# Patient Record
Sex: MALE | Race: WHITE | Employment: FULL TIME | URBAN - METROPOLITAN AREA
[De-identification: names, ages, dates, MRNs, and addresses within clinical notes are randomized per-mention and may not be internally consistent; named-entity substitution may affect disease eponyms.]

---

## 2018-06-28 ENCOUNTER — OFFICE VISIT (OUTPATIENT)
Dept: FAMILY MEDICINE | Facility: CLINIC | Age: 53
End: 2018-06-28

## 2018-06-28 ENCOUNTER — HOSPITAL ENCOUNTER (OUTPATIENT)
Dept: LAB | Facility: CLINIC | Age: 53
Discharge: HOME | End: 2018-06-28
Attending: ORTHOPAEDIC SURGERY | Admitting: PHYSICIAN ASSISTANT
Payer: COMMERCIAL

## 2018-06-28 VITALS
DIASTOLIC BLOOD PRESSURE: 82 MMHG | WEIGHT: 245 LBS | HEIGHT: 71 IN | TEMPERATURE: 98.1 F | SYSTOLIC BLOOD PRESSURE: 126 MMHG | BODY MASS INDEX: 34.3 KG/M2 | HEART RATE: 80 BPM

## 2018-06-28 DIAGNOSIS — Z71.89 ACP (ADVANCE CARE PLANNING): ICD-10-CM

## 2018-06-28 DIAGNOSIS — Z01.818 PRE-OP EXAM: Primary | ICD-10-CM

## 2018-06-28 DIAGNOSIS — R73.01 ELEVATED FASTING GLUCOSE: ICD-10-CM

## 2018-06-28 DIAGNOSIS — Z76.89 HEALTH CARE HOME: ICD-10-CM

## 2018-06-28 DIAGNOSIS — G47.33 OSA (OBSTRUCTIVE SLEEP APNEA): ICD-10-CM

## 2018-06-28 DIAGNOSIS — E66.9 OBESITY (BMI 30-39.9): ICD-10-CM

## 2018-06-28 DIAGNOSIS — M16.12 PRIMARY OSTEOARTHRITIS OF LEFT HIP: ICD-10-CM

## 2018-06-28 LAB
ERYTHROCYTE [DISTWIDTH] IN BLOOD BY AUTOMATED COUNT: 12.3 %
HBA1C MFR BLD: 5.5 % (ref 4–7)
HCT VFR BLD AUTO: 48.6 % (ref 40–53)
HEMOGLOBIN: 16.3 G/DL (ref 13.3–17.7)
MCH RBC QN AUTO: 31 PG (ref 26–33)
MCHC RBC AUTO-ENTMCNC: 33.5 G/DL (ref 31–36)
MCV RBC AUTO: 92.4 FL (ref 78–100)
MRSA DNA SPEC QL NAA+PROBE: NEGATIVE
PLATELET COUNT - QUEST: 211 10^9/L (ref 150–375)
RBC # BLD AUTO: 5.26 10*12/L (ref 4.4–5.9)
SPECIMEN SOURCE: NORMAL
WBC # BLD AUTO: 8.5 10*9/L (ref 4–11)

## 2018-06-28 PROCEDURE — 36415 COLL VENOUS BLD VENIPUNCTURE: CPT | Performed by: PHYSICIAN ASSISTANT

## 2018-06-28 PROCEDURE — 87640 STAPH A DNA AMP PROBE: CPT | Mod: XU | Performed by: PHYSICIAN ASSISTANT

## 2018-06-28 PROCEDURE — 85027 COMPLETE CBC AUTOMATED: CPT | Performed by: PHYSICIAN ASSISTANT

## 2018-06-28 PROCEDURE — 83036 HEMOGLOBIN GLYCOSYLATED A1C: CPT | Performed by: PHYSICIAN ASSISTANT

## 2018-06-28 PROCEDURE — 99203 OFFICE O/P NEW LOW 30 MIN: CPT | Performed by: PHYSICIAN ASSISTANT

## 2018-06-28 PROCEDURE — 87641 MR-STAPH DNA AMP PROBE: CPT | Performed by: PHYSICIAN ASSISTANT

## 2018-06-28 NOTE — MR AVS SNAPSHOT
"              After Visit Summary   6/28/2018    Luis M Joshi    MRN: [id number]           Patient Information     Date Of Birth          1965        Visit Information        Provider Department      6/28/2018 3:30 PM Ana Paula Mejia PA [facility]        Today's Diagnoses     Pre-op exam    -  1    Primary osteoarthritis of left hip        ACP (advance care planning)        Health Care Home        PANKAJ (obstructive sleep apnea)        Elevated fasting glucose        Obesity (BMI 30-39.9)           Follow-ups after your visit        Your next 10 appointments already scheduled     Jul 13, 2018   Procedure with Hari Bond MD   [facility] (--)    [address] [facility] [address] [phone]              Who to contact     If you have questions or need follow up information about today's clinic visit or your schedule please contact [facility] directly at [phone].  Normal or non-critical lab and imaging results will be communicated to you by MyChart, letter or phone within 4 business days after the clinic has received the results. If you do not hear from us within 7 days, please contact the clinic through MyChart or phone. If you have a critical or abnormal lab result, we will notify you by phone as soon as possible.  Submit refill requests through [employer] or call your pharmacy and they will forward the refill request to us. Please allow 3 business days for your refill to be completed.          Additional Information About Your Visit        Care EveryWhere ID     This is your Care EveryWhere ID. This could be used by other organizations to access your [place] medical records  [id number]        Your Vitals Were     Pulse Temperature Height BMI (Body Mass Index)          80 98.1  F (36.7  C) (Oral) 1.803 m (5' 11\") 34.17 kg/m2         Blood Pressure from Last 3 Encounters:   06/28/18 126/82   10/03/16 " 135/85   09/28/16 127/77    Weight from Last 3 Encounters:   06/28/18 111.1 kg (245 lb)   06/27/18 108.9 kg (240 lb)   10/02/16 108.9 kg (240 lb)              We Performed the Following     Hemoglobin A1c (BFP)     HEMOGRAM/PLATELET (BFP)     VENOUS COLLECTION        Primary Care Provider Office Phone # Fax #    SALMA Saeed [phone] [address]  [facility] [address]        Equal Access to Services     [facility]: Hadii aad ku hadasho Soomaali, waaxda luqadaha, qaybta kaalmada adeegyaelvis, waxcaitie cazares . So [facility] [phone].    ATENCIÓN: Si habla español, tiene a mays disposición servicios gratuitos de asistencia lingüística. Llame al [phone].    We comply with applicable federal civil rights laws and [place] laws. We do not discriminate on the basis of race, color, national origin, age, disability, sex, sexual orientation, or gender identity.            Thank you!     Thank you for choosing [facility]  for your care. Our goal is always to provide you with excellent care. Hearing back from our patients is one way we can continue to improve our services. Please take a few minutes to complete the written survey that you may receive in the mail after your visit with us. Thank you!             Your Updated Medication List - Protect others around you: Learn how to safely use, store and throw away your medicines at www.disposemymeds.org.      Notice  As of 6/28/2018 11:59 PM    You have not been prescribed any medications.

## 2018-06-28 NOTE — PROGRESS NOTES
[facility]  [address]  [address]  [facility] [address]  [phone]  Dept: [phone]    PRE-OP EVALUATION:  Today's date: 2018    Luis M Joshi (: 1965) presents for pre-operative evaluation assessment as requested by Dr. Hari Bond.  He requires evaluation and anesthesia risk assessment prior to undergoing surgery/procedure for treatment of deterioration of the cartilage in the left hip with degenerative tissue.    Proposed Surgery/ Procedure: Total Hip Replacement (Left)  Date of Surgery/ Procedure: 2018  Time of Surgery/ Procedure: 12:00 p.m.  Hospital/Surgical Facility: [facility]  Fax number for surgical facility: [phone]  Primary Physician: Ana Paula Mejia  Type of Anesthesia Anticipated: General    Patient has a Health Care Directive or Living Will:  NO (I gave patient document to complete)    1. NO - Do you have a history of heart attack, stroke, stent, bypass or surgery on an artery in the head, neck, heart or legs?  2. NO - Do you ever have any pain or discomfort in your chest?  3. NO - Do you have a history of  Heart Failure?  4. NO - Are you troubled by shortness of breath when: walking on the level, up a slight hill or at night?  5. NO - Do you currently have a cold, bronchitis or other respiratory infection?  6. NO - Do you have a cough, shortness of breath or wheezing?  7. NO - Do you sometimes get pains in the calves of your legs when you walk?  8. NO - Do you or anyone in your family have previous history of blood clots?  9. NO - Do you or does anyone in your family have a serious bleeding problem such as prolonged bleeding following surgeries or cuts?  10. NO - Have you ever had problems with anemia or been told to take iron pills?  11. NO - Have you had any abnormal blood loss such as black, tarry or bloody stools, or abnormal vaginal bleeding?  12. NO - Have you ever had a blood transfusion?  13.  NO - Have you or any of your relatives ever had problems with anesthesia?  14. YES - Do you have sleep apnea, excessive snoring or daytime drowsiness? Has PANKAJ  15. NO - Do you have any prosthetic heart valves?  16. NO - Do you have prosthetic joints? Plate and screws in right ankle      HPI:     HPI related to upcoming procedure: Left hip pain progressing in the last 2 years.       See problem list for active medical problems.  Problems all longstanding and stable, except as noted/documented.  See ROS for pertinent symptoms related to these conditions.                                                                                                                                                          .    MEDICAL HISTORY:     Patient Active Problem List    Diagnosis Date Noted     PANKAJ (obstructive sleep apnea) - dg 2017 06/28/2018     Priority: Medium     Elevated fasting glucose 06/28/2018     Priority: Medium     Renal colic 10/02/2016     Priority: Medium     ACP (advance care planning) 06/28/2018     Priority: Low     Health Care Home 06/28/2018     Priority: Low      Past Medical History:   Diagnosis Date     Kidney stones      Sleep apnea     CPAP will bring on the day of surgery.     Past Surgical History:   Procedure Laterality Date     C OPEN RX ANKLE DISLOCATN+FIXATN Right 2016     LASER HOLMIUM LITHOTRIPSY URETER(S), INSERT STENT, COMBINED Right 10/3/2016    Procedure: COMBINED CYSTOSCOPY, URETEROSCOPY, LASER HOLMIUM LITHOTRIPSY URETER(S), INSERT STENT;  Surgeon: Trenton Calvin MD;  Location:  OR     No current outpatient prescriptions on file.     OTC products: None    No Known Allergies   Latex Allergy: NO    Social History   Substance Use Topics     Smoking status: Never Smoker     Smokeless tobacco: Never Used     Alcohol use 2.4 oz/week     4 Standard drinks or equivalent per week     History   Drug Use No       REVIEW OF SYSTEMS:   Constitutional, neuro, ENT, endocrine, pulmonary,  "cardiac, gastrointestinal, genitourinary, musculoskeletal, integument and psychiatric systems are negative, except as otherwise noted.    EXAM:   /82 (BP Location: Left arm, Patient Position: Chair, Cuff Size: Adult Large)  Pulse 80  Temp 98.1  F (36.7  C) (Oral)  Ht 1.803 m (5' 11\")  Wt 111.1 kg (245 lb)  BMI 34.17 kg/m2    GENERAL APPEARANCE: healthy, alert and no distress     EYES: EOMI,  PERRL     HENT: ear canals and TM's normal and nose and mouth without ulcers or lesions     NECK: no adenopathy, no asymmetry, masses, or scars and thyroid normal to palpation     RESP: lungs clear to auscultation - no rales, rhonchi or wheezes     CV: regular rates and rhythm, normal S1 S2, no S3 or S4 and no murmur, click or rub     ABDOMEN:  soft, nontender, no HSM or masses and bowel sounds normal     MS: extremities normal- no gross deformities noted, no evidence of inflammation in joints, FROM in all extremities.     SKIN: no suspicious lesions or rashes     NEURO: Normal strength and tone, sensory exam grossly normal, mentation intact and speech normal     PSYCH: mentation appears normal. and affect normal/bright     LYMPHATICS: No cervical adenopathy    DIAGNOSTICS:       MRSA/MSSA screening for elective joint replacement surgery - Negative    Labs Resulted Today:   Results for orders placed or performed in visit on 06/28/18   HEMOGRAM/PLATELET (BFP)   Result Value Ref Range    WBC 8.5 4.0 - 11 10*9/L    RBC Count 5.26 4.4 - 5.9 10*12/L    Hemoglobin 16.3 13.3 - 17.7 g/dL    Hematocrit 48.6 40.0 - 53.0 %    MCV 92.4 78 - 100 fL    MCH 31.0 26 - 33 pg    MCHC 33.5 31 - 36 g/dL    RDW 12.3 %    Platelet Count 211 150 - 375 10^9/L   Hemoglobin A1c (BFP)   Result Value Ref Range    Hemoglobin A1C 5.5 4.0 - 7.0 %       Recent Labs   Lab Test  10/03/16   0636  10/02/16   1000   HGB  14.2  15.7   PLT  208  246   NA  137  137   POTASSIUM  4.7  3.6   CR  1.61*  1.35*        IMPRESSION:   Reason for surgery/procedure: " Left total hip replacement  Diagnosis/reason for consult:  Preoperative clearance      The proposed surgical procedure is considered INTERMEDIATE risk.    REVISED CARDIAC RISK INDEX  The patient has the following serious cardiovascular risks for perioperative complications such as (MI, PE, VFib and 3  AV Block):  No serious cardiac risks  INTERPRETATION: 0 risks: Class I (very low risk - 0.4% complication rate)    The patient has the following additional risks for perioperative complications:  No identified additional risks      ICD-10-CM    1. Pre-op exam Z01.818 HEMOGRAM/PLATELET (BFP)     Hemoglobin A1c (BFP)     VENOUS COLLECTION   2. Primary osteoarthritis of left hip M16.12    3. ACP (advance care planning) Z71.89    4. Health Care Home Z76.89    5. PANKAJ (obstructive sleep apnea) G47.33    6. Elevated fasting glucose R73.01    7. Obesity (BMI 30-39.9) E66.9        RECOMMENDATIONS:       Obstructive Sleep Apnea (or suspected sleep apnea)  Patient is to bring their home CPAP with them on the day of surgery      --Patient is on no chronic medications    APPROVAL GIVEN to proceed with proposed procedure, without further diagnostic evaluation       Signed Electronically by: Ana Paula Mejia PA-C    Copy of this evaluation report is provided to requesting physician.    [facility] Preop Guidelines    Revised Cardiac Risk Index

## 2018-07-10 NOTE — H&P (VIEW-ONLY)
[facility]  [address]  [address]  [facility] [address]  [phone]  Dept: [phone]    PRE-OP EVALUATION:  Today's date: 2018    Luis M Joshi (: 1965) presents for pre-operative evaluation assessment as requested by Dr. Hari Bond.  He requires evaluation and anesthesia risk assessment prior to undergoing surgery/procedure for treatment of deterioration of the cartilage in the left hip with degenerative tissue.    Proposed Surgery/ Procedure: Total Hip Replacement (Left)  Date of Surgery/ Procedure: 2018  Time of Surgery/ Procedure: 12:00 p.m.  Hospital/Surgical Facility: [facility]  Fax number for surgical facility: [phone]  Primary Physician: Ana Paula Mejia  Type of Anesthesia Anticipated: General    Patient has a Health Care Directive or Living Will:  NO (I gave patient document to complete)    1. NO - Do you have a history of heart attack, stroke, stent, bypass or surgery on an artery in the head, neck, heart or legs?  2. NO - Do you ever have any pain or discomfort in your chest?  3. NO - Do you have a history of  Heart Failure?  4. NO - Are you troubled by shortness of breath when: walking on the level, up a slight hill or at night?  5. NO - Do you currently have a cold, bronchitis or other respiratory infection?  6. NO - Do you have a cough, shortness of breath or wheezing?  7. NO - Do you sometimes get pains in the calves of your legs when you walk?  8. NO - Do you or anyone in your family have previous history of blood clots?  9. NO - Do you or does anyone in your family have a serious bleeding problem such as prolonged bleeding following surgeries or cuts?  10. NO - Have you ever had problems with anemia or been told to take iron pills?  11. NO - Have you had any abnormal blood loss such as black, tarry or bloody stools, or abnormal vaginal bleeding?  12. NO - Have you ever had a blood transfusion?  13.  NO - Have you or any of your relatives ever had problems with anesthesia?  14. YES - Do you have sleep apnea, excessive snoring or daytime drowsiness? Has PANKAJ  15. NO - Do you have any prosthetic heart valves?  16. NO - Do you have prosthetic joints? Plate and screws in right ankle      HPI:     HPI related to upcoming procedure: Left hip pain progressing in the last 2 years.       See problem list for active medical problems.  Problems all longstanding and stable, except as noted/documented.  See ROS for pertinent symptoms related to these conditions.                                                                                                                                                          .    MEDICAL HISTORY:     Patient Active Problem List    Diagnosis Date Noted     PANKAJ (obstructive sleep apnea) - dg 2017 06/28/2018     Priority: Medium     Elevated fasting glucose 06/28/2018     Priority: Medium     Renal colic 10/02/2016     Priority: Medium     ACP (advance care planning) 06/28/2018     Priority: Low     Health Care Home 06/28/2018     Priority: Low      Past Medical History:   Diagnosis Date     Kidney stones      Sleep apnea     CPAP will bring on the day of surgery.     Past Surgical History:   Procedure Laterality Date     C OPEN RX ANKLE DISLOCATN+FIXATN Right 2016     LASER HOLMIUM LITHOTRIPSY URETER(S), INSERT STENT, COMBINED Right 10/3/2016    Procedure: COMBINED CYSTOSCOPY, URETEROSCOPY, LASER HOLMIUM LITHOTRIPSY URETER(S), INSERT STENT;  Surgeon: Trenton Calvin MD;  Location:  OR     No current outpatient prescriptions on file.     OTC products: None    No Known Allergies   Latex Allergy: NO    Social History   Substance Use Topics     Smoking status: Never Smoker     Smokeless tobacco: Never Used     Alcohol use 2.4 oz/week     4 Standard drinks or equivalent per week     History   Drug Use No       REVIEW OF SYSTEMS:   Constitutional, neuro, ENT, endocrine, pulmonary,  "cardiac, gastrointestinal, genitourinary, musculoskeletal, integument and psychiatric systems are negative, except as otherwise noted.    EXAM:   /82 (BP Location: Left arm, Patient Position: Chair, Cuff Size: Adult Large)  Pulse 80  Temp 98.1  F (36.7  C) (Oral)  Ht 1.803 m (5' 11\")  Wt 111.1 kg (245 lb)  BMI 34.17 kg/m2    GENERAL APPEARANCE: healthy, alert and no distress     EYES: EOMI,  PERRL     HENT: ear canals and TM's normal and nose and mouth without ulcers or lesions     NECK: no adenopathy, no asymmetry, masses, or scars and thyroid normal to palpation     RESP: lungs clear to auscultation - no rales, rhonchi or wheezes     CV: regular rates and rhythm, normal S1 S2, no S3 or S4 and no murmur, click or rub     ABDOMEN:  soft, nontender, no HSM or masses and bowel sounds normal     MS: extremities normal- no gross deformities noted, no evidence of inflammation in joints, FROM in all extremities.     SKIN: no suspicious lesions or rashes     NEURO: Normal strength and tone, sensory exam grossly normal, mentation intact and speech normal     PSYCH: mentation appears normal. and affect normal/bright     LYMPHATICS: No cervical adenopathy    DIAGNOSTICS:       MRSA/MSSA screening for elective joint replacement surgery - Negative    Labs Resulted Today:   Results for orders placed or performed in visit on 06/28/18   HEMOGRAM/PLATELET (BFP)   Result Value Ref Range    WBC 8.5 4.0 - 11 10*9/L    RBC Count 5.26 4.4 - 5.9 10*12/L    Hemoglobin 16.3 13.3 - 17.7 g/dL    Hematocrit 48.6 40.0 - 53.0 %    MCV 92.4 78 - 100 fL    MCH 31.0 26 - 33 pg    MCHC 33.5 31 - 36 g/dL    RDW 12.3 %    Platelet Count 211 150 - 375 10^9/L   Hemoglobin A1c (BFP)   Result Value Ref Range    Hemoglobin A1C 5.5 4.0 - 7.0 %       Recent Labs   Lab Test  10/03/16   0636  10/02/16   1000   HGB  14.2  15.7   PLT  208  246   NA  137  137   POTASSIUM  4.7  3.6   CR  1.61*  1.35*        IMPRESSION:   Reason for surgery/procedure: " Left total hip replacement  Diagnosis/reason for consult:  Preoperative clearance      The proposed surgical procedure is considered INTERMEDIATE risk.    REVISED CARDIAC RISK INDEX  The patient has the following serious cardiovascular risks for perioperative complications such as (MI, PE, VFib and 3  AV Block):  No serious cardiac risks  INTERPRETATION: 0 risks: Class I (very low risk - 0.4% complication rate)    The patient has the following additional risks for perioperative complications:  No identified additional risks      ICD-10-CM    1. Pre-op exam Z01.818 HEMOGRAM/PLATELET (BFP)     Hemoglobin A1c (BFP)     VENOUS COLLECTION   2. Primary osteoarthritis of left hip M16.12    3. ACP (advance care planning) Z71.89    4. Health Care Home Z76.89    5. PANKAJ (obstructive sleep apnea) G47.33    6. Elevated fasting glucose R73.01    7. Obesity (BMI 30-39.9) E66.9        RECOMMENDATIONS:       Obstructive Sleep Apnea (or suspected sleep apnea)  Patient is to bring their home CPAP with them on the day of surgery      --Patient is on no chronic medications    APPROVAL GIVEN to proceed with proposed procedure, without further diagnostic evaluation       Signed Electronically by: Ana Paula Mejia PA-C    Copy of this evaluation report is provided to requesting physician.    [facility] Preop Guidelines    Revised Cardiac Risk Index

## 2018-07-11 NOTE — PHARMACY-ADMISSION MEDICATION HISTORY
Admission medication history interview status for this patient is complete. See [facility] admission navigator for allergy information, prior to admission medications and immunization status.     Med rec completed by pre admitting Tawnya Pritchett RN Wed Jun 27, 2018  1:45 PM         Prior to Admission medications    Not on File

## 2018-07-13 ENCOUNTER — APPOINTMENT (OUTPATIENT)
Dept: GENERAL RADIOLOGY | Facility: CLINIC | Age: 53
DRG: 470 | End: 2018-07-13
Attending: ORTHOPAEDIC SURGERY
Payer: COMMERCIAL

## 2018-07-13 ENCOUNTER — HOSPITAL ENCOUNTER (INPATIENT)
Facility: CLINIC | Age: 53
LOS: 2 days | Discharge: HOME | DRG: 470 | End: 2018-07-15
Attending: ORTHOPAEDIC SURGERY | Admitting: ORTHOPAEDIC SURGERY
Payer: COMMERCIAL

## 2018-07-13 ENCOUNTER — ANESTHESIA EVENT (OUTPATIENT)
Dept: SURGERY | Facility: CLINIC | Age: 53
DRG: 470 | End: 2018-07-13
Payer: COMMERCIAL

## 2018-07-13 ENCOUNTER — ANESTHESIA (OUTPATIENT)
Dept: SURGERY | Facility: CLINIC | Age: 53
DRG: 470 | End: 2018-07-13
Payer: COMMERCIAL

## 2018-07-13 DIAGNOSIS — Z96.642 STATUS POST TOTAL REPLACEMENT OF LEFT HIP: Primary | ICD-10-CM

## 2018-07-13 PROBLEM — Z96.649 S/P TOTAL HIP ARTHROPLASTY: Status: ACTIVE | Noted: 2018-07-13

## 2018-07-13 LAB
CREAT SERPL-MCNC: 0.83 MG/DL (ref 0.66–1.25)
GFR SERPL CREATININE-BSD FRML MDRD: >90 ML/MIN/1.7M2
PLATELET # BLD AUTO: 223 10E9/L (ref 150–450)

## 2018-07-13 PROCEDURE — 37000009 ZZH ANESTHESIA TECHNICAL FEE, EACH ADDTL 15 MIN: Performed by: ORTHOPAEDIC SURGERY

## 2018-07-13 PROCEDURE — C1713 ANCHOR/SCREW BN/BN,TIS/BN: HCPCS | Performed by: ORTHOPAEDIC SURGERY

## 2018-07-13 PROCEDURE — 25000132 ZZH RX MED GY IP 250 OP 250 PS 637: Performed by: ORTHOPAEDIC SURGERY

## 2018-07-13 PROCEDURE — 37000008 ZZH ANESTHESIA TECHNICAL FEE, 1ST 30 MIN: Performed by: ORTHOPAEDIC SURGERY

## 2018-07-13 PROCEDURE — 25800025 ZZH RX 258: Performed by: ORTHOPAEDIC SURGERY

## 2018-07-13 PROCEDURE — 25000128 H RX IP 250 OP 636: Performed by: NURSE ANESTHETIST, CERTIFIED REGISTERED

## 2018-07-13 PROCEDURE — 25000128 H RX IP 250 OP 636: Performed by: ANESTHESIOLOGY

## 2018-07-13 PROCEDURE — 40000985 XR HIP PORT LT 1 VW: Mod: TC

## 2018-07-13 PROCEDURE — 25000125 ZZHC RX 250: Performed by: PHYSICIAN ASSISTANT

## 2018-07-13 PROCEDURE — 27210794 ZZH OR GENERAL SUPPLY STERILE: Performed by: ORTHOPAEDIC SURGERY

## 2018-07-13 PROCEDURE — 25000132 ZZH RX MED GY IP 250 OP 250 PS 637: Performed by: PHYSICIAN ASSISTANT

## 2018-07-13 PROCEDURE — 12000007 ZZH R&B INTERMEDIATE

## 2018-07-13 PROCEDURE — 85049 AUTOMATED PLATELET COUNT: CPT | Performed by: ORTHOPAEDIC SURGERY

## 2018-07-13 PROCEDURE — 36000093 ZZH SURGERY LEVEL 4 1ST 30 MIN: Performed by: ORTHOPAEDIC SURGERY

## 2018-07-13 PROCEDURE — 71000013 ZZH RECOVERY PHASE 1 LEVEL 1 EA ADDTL HR: Performed by: ORTHOPAEDIC SURGERY

## 2018-07-13 PROCEDURE — 40000985 XR PELVIS AND HIP PORTABLE LEFT 1 VIEW

## 2018-07-13 PROCEDURE — C1776 JOINT DEVICE (IMPLANTABLE): HCPCS | Performed by: ORTHOPAEDIC SURGERY

## 2018-07-13 PROCEDURE — 82565 ASSAY OF CREATININE: CPT | Performed by: ORTHOPAEDIC SURGERY

## 2018-07-13 PROCEDURE — 25000566 ZZH SEVOFLURANE, EA 15 MIN: Performed by: ORTHOPAEDIC SURGERY

## 2018-07-13 PROCEDURE — 36000063 ZZH SURGERY LEVEL 4 EA 15 ADDTL MIN: Performed by: ORTHOPAEDIC SURGERY

## 2018-07-13 PROCEDURE — 25000128 H RX IP 250 OP 636: Performed by: ORTHOPAEDIC SURGERY

## 2018-07-13 PROCEDURE — 36415 COLL VENOUS BLD VENIPUNCTURE: CPT | Performed by: ORTHOPAEDIC SURGERY

## 2018-07-13 PROCEDURE — 40000306 ZZH STATISTIC PRE PROC ASSESS II: Performed by: ORTHOPAEDIC SURGERY

## 2018-07-13 PROCEDURE — 71000012 ZZH RECOVERY PHASE 1 LEVEL 1 FIRST HR: Performed by: ORTHOPAEDIC SURGERY

## 2018-07-13 PROCEDURE — 0SRB04A REPLACEMENT OF LEFT HIP JOINT WITH CERAMIC ON POLYETHYLENE SYNTHETIC SUBSTITUTE, UNCEMENTED, OPEN APPROACH: ICD-10-PCS | Performed by: ORTHOPAEDIC SURGERY

## 2018-07-13 PROCEDURE — 25000125 ZZHC RX 250: Performed by: NURSE ANESTHETIST, CERTIFIED REGISTERED

## 2018-07-13 PROCEDURE — 25000128 H RX IP 250 OP 636: Performed by: PHYSICIAN ASSISTANT

## 2018-07-13 DEVICE — IMPLANTABLE DEVICE: Type: IMPLANTABLE DEVICE | Site: HIP | Status: FUNCTIONAL

## 2018-07-13 DEVICE — IMP SCR BIOM G7 ACETABULAR DOME 6.5X20MM LOW PRO 010000997: Type: IMPLANTABLE DEVICE | Site: HIP | Status: FUNCTIONAL

## 2018-07-13 DEVICE — IMP LINER BIOM G7 ACET NEU ARCOMXL CRSLNK 36MM SZ E 10000740: Type: IMPLANTABLE DEVICE | Site: HIP | Status: FUNCTIONAL

## 2018-07-13 DEVICE — IMP SHELL BIOM G7 ACETAB PPS LIM HOLE 52MM SZ E 010000663: Type: IMPLANTABLE DEVICE | Site: HIP | Status: FUNCTIONAL

## 2018-07-13 RX ORDER — ZOLPIDEM TARTRATE 5 MG/1
5 TABLET ORAL
Status: DISCONTINUED | OUTPATIENT
Start: 2018-07-14 | End: 2018-07-15 | Stop reason: HOSPADM

## 2018-07-13 RX ORDER — AMOXICILLIN 250 MG
2 CAPSULE ORAL 2 TIMES DAILY
Status: DISCONTINUED | OUTPATIENT
Start: 2018-07-13 | End: 2018-07-15 | Stop reason: HOSPADM

## 2018-07-13 RX ORDER — ONDANSETRON 4 MG/1
4 TABLET, ORALLY DISINTEGRATING ORAL EVERY 30 MIN PRN
Status: DISCONTINUED | OUTPATIENT
Start: 2018-07-13 | End: 2018-07-13 | Stop reason: HOSPADM

## 2018-07-13 RX ORDER — GLYCOPYRROLATE 0.2 MG/ML
INJECTION, SOLUTION INTRAMUSCULAR; INTRAVENOUS PRN
Status: DISCONTINUED | OUTPATIENT
Start: 2018-07-13 | End: 2018-07-13

## 2018-07-13 RX ORDER — ONDANSETRON 2 MG/ML
4 INJECTION INTRAMUSCULAR; INTRAVENOUS EVERY 6 HOURS PRN
Status: DISCONTINUED | OUTPATIENT
Start: 2018-07-13 | End: 2018-07-15 | Stop reason: HOSPADM

## 2018-07-13 RX ORDER — CELECOXIB 200 MG/1
400 CAPSULE ORAL ONCE
Status: COMPLETED | OUTPATIENT
Start: 2018-07-13 | End: 2018-07-13

## 2018-07-13 RX ORDER — ACETAMINOPHEN 325 MG/1
650 TABLET ORAL EVERY 4 HOURS PRN
Status: DISCONTINUED | OUTPATIENT
Start: 2018-07-16 | End: 2018-07-15 | Stop reason: HOSPADM

## 2018-07-13 RX ORDER — METOPROLOL TARTRATE 1 MG/ML
1-2 INJECTION, SOLUTION INTRAVENOUS EVERY 5 MIN PRN
Status: DISCONTINUED | OUTPATIENT
Start: 2018-07-13 | End: 2018-07-13 | Stop reason: HOSPADM

## 2018-07-13 RX ORDER — AMOXICILLIN 250 MG
1 CAPSULE ORAL 2 TIMES DAILY
Status: DISCONTINUED | OUTPATIENT
Start: 2018-07-13 | End: 2018-07-15 | Stop reason: HOSPADM

## 2018-07-13 RX ORDER — SODIUM CHLORIDE 9 MG/ML
INJECTION, SOLUTION INTRAVENOUS CONTINUOUS
Status: DISCONTINUED | OUTPATIENT
Start: 2018-07-13 | End: 2018-07-14

## 2018-07-13 RX ORDER — ONDANSETRON 2 MG/ML
INJECTION INTRAMUSCULAR; INTRAVENOUS PRN
Status: DISCONTINUED | OUTPATIENT
Start: 2018-07-13 | End: 2018-07-13

## 2018-07-13 RX ORDER — HYDROMORPHONE HYDROCHLORIDE 1 MG/ML
.3-.5 INJECTION, SOLUTION INTRAMUSCULAR; INTRAVENOUS; SUBCUTANEOUS EVERY 5 MIN PRN
Status: DISCONTINUED | OUTPATIENT
Start: 2018-07-13 | End: 2018-07-13 | Stop reason: HOSPADM

## 2018-07-13 RX ORDER — FENTANYL CITRATE 50 UG/ML
INJECTION, SOLUTION INTRAMUSCULAR; INTRAVENOUS PRN
Status: DISCONTINUED | OUTPATIENT
Start: 2018-07-13 | End: 2018-07-13

## 2018-07-13 RX ORDER — HYDROXYZINE HYDROCHLORIDE 25 MG/1
25 TABLET, FILM COATED ORAL EVERY 6 HOURS PRN
Status: DISCONTINUED | OUTPATIENT
Start: 2018-07-13 | End: 2018-07-15 | Stop reason: HOSPADM

## 2018-07-13 RX ORDER — NALOXONE HYDROCHLORIDE 0.4 MG/ML
.1-.4 INJECTION, SOLUTION INTRAMUSCULAR; INTRAVENOUS; SUBCUTANEOUS
Status: DISCONTINUED | OUTPATIENT
Start: 2018-07-13 | End: 2018-07-13

## 2018-07-13 RX ORDER — ONDANSETRON 2 MG/ML
4 INJECTION INTRAMUSCULAR; INTRAVENOUS EVERY 30 MIN PRN
Status: DISCONTINUED | OUTPATIENT
Start: 2018-07-13 | End: 2018-07-13 | Stop reason: HOSPADM

## 2018-07-13 RX ORDER — CEFAZOLIN SODIUM 2 G/100ML
2 INJECTION, SOLUTION INTRAVENOUS EVERY 8 HOURS
Status: COMPLETED | OUTPATIENT
Start: 2018-07-13 | End: 2018-07-14

## 2018-07-13 RX ORDER — NALOXONE HYDROCHLORIDE 0.4 MG/ML
.1-.4 INJECTION, SOLUTION INTRAMUSCULAR; INTRAVENOUS; SUBCUTANEOUS
Status: ACTIVE | OUTPATIENT
Start: 2018-07-13 | End: 2018-07-14

## 2018-07-13 RX ORDER — LIDOCAINE 40 MG/G
CREAM TOPICAL
Status: DISCONTINUED | OUTPATIENT
Start: 2018-07-13 | End: 2018-07-15 | Stop reason: HOSPADM

## 2018-07-13 RX ORDER — ONDANSETRON 2 MG/ML
4 INJECTION INTRAMUSCULAR; INTRAVENOUS EVERY 6 HOURS
Status: DISPENSED | OUTPATIENT
Start: 2018-07-13 | End: 2018-07-14

## 2018-07-13 RX ORDER — OXYCODONE HYDROCHLORIDE 5 MG/1
5-10 TABLET ORAL
Status: DISCONTINUED | OUTPATIENT
Start: 2018-07-13 | End: 2018-07-15 | Stop reason: HOSPADM

## 2018-07-13 RX ORDER — CEFAZOLIN SODIUM 2 G/100ML
2 INJECTION, SOLUTION INTRAVENOUS
Status: COMPLETED | OUTPATIENT
Start: 2018-07-13 | End: 2018-07-13

## 2018-07-13 RX ORDER — FENTANYL CITRATE 50 UG/ML
25-50 INJECTION, SOLUTION INTRAMUSCULAR; INTRAVENOUS
Status: DISCONTINUED | OUTPATIENT
Start: 2018-07-13 | End: 2018-07-13 | Stop reason: HOSPADM

## 2018-07-13 RX ORDER — DEXAMETHASONE SODIUM PHOSPHATE 4 MG/ML
INJECTION, SOLUTION INTRA-ARTICULAR; INTRALESIONAL; INTRAMUSCULAR; INTRAVENOUS; SOFT TISSUE PRN
Status: DISCONTINUED | OUTPATIENT
Start: 2018-07-13 | End: 2018-07-13

## 2018-07-13 RX ORDER — CEFAZOLIN SODIUM 1 G/3ML
1 INJECTION, POWDER, FOR SOLUTION INTRAMUSCULAR; INTRAVENOUS SEE ADMIN INSTRUCTIONS
Status: DISCONTINUED | OUTPATIENT
Start: 2018-07-13 | End: 2018-07-13 | Stop reason: HOSPADM

## 2018-07-13 RX ORDER — NEOSTIGMINE METHYLSULFATE 1 MG/ML
VIAL (ML) INJECTION PRN
Status: DISCONTINUED | OUTPATIENT
Start: 2018-07-13 | End: 2018-07-13

## 2018-07-13 RX ORDER — SODIUM CHLORIDE, SODIUM LACTATE, POTASSIUM CHLORIDE, CALCIUM CHLORIDE 600; 310; 30; 20 MG/100ML; MG/100ML; MG/100ML; MG/100ML
INJECTION, SOLUTION INTRAVENOUS CONTINUOUS
Status: DISCONTINUED | OUTPATIENT
Start: 2018-07-13 | End: 2018-07-13 | Stop reason: HOSPADM

## 2018-07-13 RX ORDER — CELECOXIB 200 MG/1
200 CAPSULE ORAL 2 TIMES DAILY
Status: DISCONTINUED | OUTPATIENT
Start: 2018-07-14 | End: 2018-07-15 | Stop reason: HOSPADM

## 2018-07-13 RX ORDER — GLYCINE 1.5 G/100ML
SOLUTION IRRIGATION PRN
Status: DISCONTINUED | OUTPATIENT
Start: 2018-07-13 | End: 2018-07-13 | Stop reason: HOSPADM

## 2018-07-13 RX ORDER — LIDOCAINE 40 MG/G
CREAM TOPICAL
Status: DISCONTINUED | OUTPATIENT
Start: 2018-07-13 | End: 2018-07-13 | Stop reason: HOSPADM

## 2018-07-13 RX ORDER — PROPOFOL 10 MG/ML
INJECTION, EMULSION INTRAVENOUS PRN
Status: DISCONTINUED | OUTPATIENT
Start: 2018-07-13 | End: 2018-07-13

## 2018-07-13 RX ORDER — HYDROXYZINE HYDROCHLORIDE 25 MG/1
25 TABLET, FILM COATED ORAL 3 TIMES DAILY PRN
Status: DISCONTINUED | OUTPATIENT
Start: 2018-07-13 | End: 2018-07-15 | Stop reason: HOSPADM

## 2018-07-13 RX ORDER — ONDANSETRON 4 MG/1
4 TABLET, ORALLY DISINTEGRATING ORAL EVERY 6 HOURS PRN
Status: DISCONTINUED | OUTPATIENT
Start: 2018-07-13 | End: 2018-07-15 | Stop reason: HOSPADM

## 2018-07-13 RX ORDER — LIDOCAINE HYDROCHLORIDE 10 MG/ML
INJECTION, SOLUTION INFILTRATION; PERINEURAL PRN
Status: DISCONTINUED | OUTPATIENT
Start: 2018-07-13 | End: 2018-07-13

## 2018-07-13 RX ORDER — HYDROMORPHONE HYDROCHLORIDE 1 MG/ML
.3-.5 INJECTION, SOLUTION INTRAMUSCULAR; INTRAVENOUS; SUBCUTANEOUS
Status: DISCONTINUED | OUTPATIENT
Start: 2018-07-13 | End: 2018-07-15 | Stop reason: HOSPADM

## 2018-07-13 RX ORDER — PANTOPRAZOLE SODIUM 40 MG/1
40 TABLET, DELAYED RELEASE ORAL ONCE
Status: COMPLETED | OUTPATIENT
Start: 2018-07-13 | End: 2018-07-13

## 2018-07-13 RX ORDER — ACETAMINOPHEN 325 MG/1
975 TABLET ORAL EVERY 8 HOURS
Status: DISCONTINUED | OUTPATIENT
Start: 2018-07-13 | End: 2018-07-15 | Stop reason: HOSPADM

## 2018-07-13 RX ORDER — PROCHLORPERAZINE MALEATE 10 MG
10 TABLET ORAL EVERY 6 HOURS PRN
Status: DISCONTINUED | OUTPATIENT
Start: 2018-07-13 | End: 2018-07-15 | Stop reason: HOSPADM

## 2018-07-13 RX ADMIN — PANTOPRAZOLE SODIUM 40 MG: 40 TABLET, DELAYED RELEASE ORAL at 10:45

## 2018-07-13 RX ADMIN — ROCURONIUM BROMIDE 50 MG: 10 INJECTION INTRAVENOUS at 12:21

## 2018-07-13 RX ADMIN — LIDOCAINE HYDROCHLORIDE 50 MG: 10 INJECTION, SOLUTION INFILTRATION; PERINEURAL at 12:21

## 2018-07-13 RX ADMIN — GLYCOPYRROLATE 0.6 MG: 0.2 INJECTION, SOLUTION INTRAMUSCULAR; INTRAVENOUS at 14:12

## 2018-07-13 RX ADMIN — FENTANYL CITRATE 50 MCG: 50 INJECTION INTRAMUSCULAR; INTRAVENOUS at 14:54

## 2018-07-13 RX ADMIN — PROPOFOL 200 MG: 10 INJECTION, EMULSION INTRAVENOUS at 12:21

## 2018-07-13 RX ADMIN — SODIUM CHLORIDE: 9 INJECTION, SOLUTION INTRAVENOUS at 19:52

## 2018-07-13 RX ADMIN — ACETAMINOPHEN 975 MG: 325 TABLET, FILM COATED ORAL at 17:38

## 2018-07-13 RX ADMIN — CEFAZOLIN SODIUM 2 G: 2 INJECTION, SOLUTION INTRAVENOUS at 22:03

## 2018-07-13 RX ADMIN — OXYCODONE HYDROCHLORIDE 5 MG: 5 TABLET ORAL at 22:02

## 2018-07-13 RX ADMIN — FENTANYL CITRATE 250 MCG: 50 INJECTION, SOLUTION INTRAMUSCULAR; INTRAVENOUS at 12:21

## 2018-07-13 RX ADMIN — SODIUM CHLORIDE, POTASSIUM CHLORIDE, SODIUM LACTATE AND CALCIUM CHLORIDE: 600; 310; 30; 20 INJECTION, SOLUTION INTRAVENOUS at 11:16

## 2018-07-13 RX ADMIN — CEFAZOLIN SODIUM 1 G: 2 INJECTION, SOLUTION INTRAVENOUS at 14:29

## 2018-07-13 RX ADMIN — SENNOSIDES AND DOCUSATE SODIUM 2 TABLET: 8.6; 5 TABLET ORAL at 19:56

## 2018-07-13 RX ADMIN — RANITIDINE 150 MG: 150 TABLET ORAL at 19:55

## 2018-07-13 RX ADMIN — ROCURONIUM BROMIDE 10 MG: 10 INJECTION INTRAVENOUS at 13:20

## 2018-07-13 RX ADMIN — SODIUM CHLORIDE, POTASSIUM CHLORIDE, SODIUM LACTATE AND CALCIUM CHLORIDE: 600; 310; 30; 20 INJECTION, SOLUTION INTRAVENOUS at 12:47

## 2018-07-13 RX ADMIN — ONDANSETRON 4 MG: 2 INJECTION INTRAMUSCULAR; INTRAVENOUS at 17:39

## 2018-07-13 RX ADMIN — GLYCOPYRROLATE 0.2 MG: 0.2 INJECTION, SOLUTION INTRAMUSCULAR; INTRAVENOUS at 12:21

## 2018-07-13 RX ADMIN — SODIUM CHLORIDE 1000 ML: 9 INJECTION, SOLUTION INTRAVENOUS at 17:45

## 2018-07-13 RX ADMIN — FENTANYL CITRATE 50 MCG: 50 INJECTION, SOLUTION INTRAMUSCULAR; INTRAVENOUS at 14:34

## 2018-07-13 RX ADMIN — Medication 1 G: at 12:33

## 2018-07-13 RX ADMIN — FENTANYL CITRATE 50 MCG: 50 INJECTION INTRAMUSCULAR; INTRAVENOUS at 15:36

## 2018-07-13 RX ADMIN — ONDANSETRON 4 MG: 2 INJECTION INTRAMUSCULAR; INTRAVENOUS at 14:07

## 2018-07-13 RX ADMIN — ROCURONIUM BROMIDE 10 MG: 10 INJECTION INTRAVENOUS at 13:51

## 2018-07-13 RX ADMIN — CEFAZOLIN SODIUM 2 G: 2 INJECTION, SOLUTION INTRAVENOUS at 12:27

## 2018-07-13 RX ADMIN — HYDROMORPHONE HYDROCHLORIDE 0.5 MG: 1 INJECTION, SOLUTION INTRAMUSCULAR; INTRAVENOUS; SUBCUTANEOUS at 12:38

## 2018-07-13 RX ADMIN — Medication 0.5 MG: at 17:38

## 2018-07-13 RX ADMIN — CELECOXIB 400 MG: 200 CAPSULE ORAL at 10:45

## 2018-07-13 RX ADMIN — OXYCODONE HYDROCHLORIDE 5 MG: 5 TABLET ORAL at 18:53

## 2018-07-13 RX ADMIN — HYDROMORPHONE HYDROCHLORIDE 0.5 MG: 1 INJECTION, SOLUTION INTRAMUSCULAR; INTRAVENOUS; SUBCUTANEOUS at 12:51

## 2018-07-13 RX ADMIN — Medication 3 MG: at 14:12

## 2018-07-13 RX ADMIN — MIDAZOLAM 2 MG: 1 INJECTION INTRAMUSCULAR; INTRAVENOUS at 12:13

## 2018-07-13 RX ADMIN — DEXAMETHASONE SODIUM PHOSPHATE 8 MG: 4 INJECTION, SOLUTION INTRA-ARTICULAR; INTRALESIONAL; INTRAMUSCULAR; INTRAVENOUS; SOFT TISSUE at 12:21

## 2018-07-13 ASSESSMENT — ENCOUNTER SYMPTOMS: DYSRHYTHMIAS: 0

## 2018-07-13 NOTE — IP AVS SNAPSHOT
MRN:[id number]                      After Visit Summary   7/13/2018    Luis M Joshi    MRN: [id number]           Thank you!     Thank you for choosing [facility] for your care. Our goal is always to provide you with excellent care. Hearing back from our patients is one way we can continue to improve our services. Please take a few minutes to complete the written survey that you may receive in the mail after you visit. If you would like to speak to someone directly about your visit please contact Patient Relations at [phone]. Thank you!          Patient Information     Date Of Birth          1965        Designated Caregiver       Most Recent Value    Caregiver    Will someone help with your care after discharge? yes    Name of designated caregiver Adelita Joshi    Phone number of caregiver [phone]    Caregiver address [place]      About your hospital stay     You were admitted on:  July 13, 2018 You last received care in the:  [facility]    You were discharged on:  July 15, 2018        Reason for your hospital stay       Total hip replacement                  Who to Call     For medical emergencies, please call 911.  For non-urgent questions about your medical care, please call your primary care provider or clinic, [phone]  For questions related to your surgery, please call your surgery clinic        Attending Provider     Provider Specialty    Hari Bond MD Orthopaedic Surgery       Primary Care Provider Office Phone # Fax #    SALMA Saeed [phone]      After Care Instructions     Activity       Your activity upon discharge: activity as tolerated            Diet       Follow this diet upon discharge: Orders Placed This Encounter      Advance Diet as Tolerated: Regular Diet Adult            Wound care and dressings       Leave aquacel dressing in place until post-op follow-up.  If it becomes loose  or soiled then remove and replace with daily dry dressings                  Follow-up Appointments     Follow-up and recommended labs and tests        Follow up with Dr. Bond within 10-16 days.  No follow up labs or test are needed. Call [phone] with questions.                  Further instructions from your care team       Call surgeon or primary care md before or after your follow appointment if any of these issues occur:  1.Uncontrolled/persistant temperature, chills, sweats. Temp >101  2. Uncontrolled pain despite pain medication  3. Numbness or tingling in operative leg, weakness, falls  4. Increased/persistant bleeding,redness,swelling, bruising, drainage (yellow/odourous), or bleeding from or around incision or incision pulling apart.  5. Dizziness, lightheaded (could be pain meds)  6. Calf pain, hard/swollen/warm area, chest pain or shortness of breath   7. Constipation despite taking over the counter stool softner and laxative for constipation   8. Trouble voiding or signs or symptoms of urinary tract infection  9. Uncontrolled bleeding (nose bleed, incision)  10.  if unable to wake call [phone]    Please have all family and caregivers review this information.    Other instructions:  See general discharge instruction sheet for hips.  1. Do exercises as instructed by therapist-slowly increased activity as pain tolerates  2.   3. Walk daily increasing distance and time each day by a little.  4. Ice hip for swelling and discomfort 3-4 times daily for 20 minutes  5.Notify your dentist of your implant so you can get antibiotics before any dental work or for any other invasive procedure.  6. Take over the counter stool softener (mirilax, senna, colace) while on narcotics to prevent constipation so bowel movements are regular., take laxative (milk of magnesia) or a suppository if no bm in 2-3 days (take as directed)  7.  8.Keep track of when you take all medication, especially pain medication. See bottles for  "instructions and side effects  9. Incentive spirometer hourly to help prevent pneumonia  10. See medication handouts for medication information and side effects  11. DO NOT DRINK alcohol or DRIVE on narcotic pain medication.    Friends and family please read and review all discharge information and medication sheet    IF unable to wake call [phone]    Dressing information:Do not change dressing  Can shower with aquacell dressing, it is waterproof-do not remove the dressing will be changed at the follow up apt. with your suregon  Inspect surrounding skin daily for s/s of infections.   Do not soak in tub or pool.   If dressing  Loosens wash hands and tape edge down then call surgeon for direction-do not touch incision   If dressing is 1/2 or more full of drainage or leaking call your suregon  If dressing is half full of drainage call your surgeon      Follow up Appointment:  Make follow up apt. 10-14 days post surgery,call to make apt. [phone]. Call the same number with any questions or issues prior to or after apt.        Pending Results     No orders found from 7/11/2018 to 7/14/2018.            Statement of Approval     Ordered          07/14/18 0645  I have reviewed and agree with all the recommendations and orders detailed in this document.  EFFECTIVE NOW     Approved and electronically signed by:  Hari Bond MD             Admission Information     Date & Time Provider Department Dept. Phone    7/13/2018 Hari Bond MD [facility] Ortho Spine [phone]      Your Vitals Were     Blood Pressure Pulse Temperature Respirations Height Weight    119/76 (BP Location: Left arm) 75 97.2  F (36.2  C) (Oral) 16 1.803 m (5' 11\") 110.7 kg (244 lb)    Pulse Oximetry BMI (Body Mass Index)                95% 34.03 kg/m2          Care EveryWhere ID     This is your Care EveryWhere ID. This could be used by other organizations to access your [place] medical records  [id number]        Equal " Access to Services     [facility]: Hadii aad ku hadpericoaminta Sonicolleali, waaxda luqadaha, qaybta kaalmada adesanfordelvis, ilana subramanianmagnoliakrista fisher. [facility] [phone].    ATENCIÓN: Si kranthila ed, tiene a mays disposición servicios gratuitos de asistencia lingüística. Llame al [phone].    We comply with applicable federal civil rights laws and [place] laws. We do not discriminate on the basis of race, color, national origin, age, disability, sex, sexual orientation, or gender identity.               Review of your medicines      START taking        Dose / Directions    aspirin 325 MG EC tablet        Dose:  325 mg   Take 1 tablet (325 mg) by mouth 2 times daily   Quantity:  70 tablet   Refills:  0       order for DME        Equipment being ordered: Walker Wheels () and Walker () Treatment Diagnosis: Gait instability   Quantity:  1 each   Refills:  0       oxyCODONE-acetaminophen 5-325 MG per tablet   Commonly known as:  PERCOCET   Notes to Patient:  Take one tab for pain 1-4  2 tabs for pain 5-10  Each tab has 325mg of tylenol in it. Do not take additional tylenol        Dose:  1-2 tablet   Take 1-2 tablets by mouth every 4 hours as needed for pain   Quantity:  60 tablet   Refills:  0       senna-docusate 8.6-50 MG per tablet   Commonly known as:  SENOKOT-S;PERICOLACE        Dose:  2 tablet   Take 2 tablets by mouth 2 times daily   Quantity:  40 tablet   Refills:  0            Where to get your medicines      These medications were sent to [facility] [id number] [facility] [address]     Phone:  [phone]     aspirin 325 MG EC tablet    senna-docusate 8.6-50 MG per tablet         Some of these will need a paper prescription and others can be bought over the counter. Ask your nurse if you have questions.     Bring a paper prescription for each of these medications     order for DME    oxyCODONE-acetaminophen 5-325 MG  per tablet                Protect others around you: Learn how to safely use, store and throw away your medicines at www.disposemymeds.org.        Information about OPIOIDS     PRESCRIPTION OPIOIDS: WHAT YOU NEED TO KNOW   We gave you an opioid (narcotic) pain medicine. It is important to manage your pain, but opioids are not always the best choice. You should first try all the other options your care team gave you. Take this medicine for as short a time (and as few doses) as possible.     These medicines have risks:    DO NOT drive when on new or higher doses of pain medicine. These medicines can affect your alertness and reaction times, and you could be arrested for driving under the influence (DUI). If you need to use opioids long-term, talk to your care team about driving.    DO NOT operate heave machinery    DO NOT do any other dangerous activities while taking these medicines.     DO NOT drink any alcohol while taking these medicines.      If the opioid prescribed includes acetaminophen, DO NOT take with any other medicines that contain acetaminophen. Read all labels carefully. Look for the word  acetaminophen  or  Tylenol.  Ask your pharmacist if you have questions or are unsure.    You can get addicted to pain medicines, especially if you have a history of addiction (chemical, alcohol or substance dependence). Talk to your care team about ways to reduce this risk.    Store your pills in a secure place, locked if possible. We will not replace any lost or stolen medicine. If you don t finish your medicine, please throw away (dispose) as directed by your pharmacist. The [place] Pollution Control Agency has more information about safe disposal: https://www.pca.state.[place]/living-green/managing-unwanted-medications.     All opioids tend to cause constipation. Drink plenty of water and eat foods that have a lot of fiber, such as fruits, vegetables, prune juice, apple juice and high-fiber cereal. Take a laxative  (Miralax, milk of magnesia, Colace, Senna) if you don t move your bowels at least every other day.              Medication List: This is a list of all your medications and when to take them. Check marks below indicate your daily home schedule. Keep this list as a reference.      Medications           Morning Afternoon Evening Bedtime As Needed    aspirin 325 MG EC tablet   Take 1 tablet (325 mg) by mouth 2 times daily   Next Dose Due:  Sunday evening  Daily at 8am and 8pm until all complete                                        order for DME   Equipment being ordered: Walker Wheels () and Walker () Treatment Diagnosis: Gait instability                                oxyCODONE-acetaminophen 5-325 MG per tablet   Commonly known as:  PERCOCET   Take 1-2 tablets by mouth every 4 hours as needed for pain   Next Dose Due:  Can have after 5pm today     Notes to Patient:  Take one tab for pain 1-4  2 tabs for pain 5-10  Each tab has 325mg of tylenol in it. Do not take additional tylenol                                   senna-docusate 8.6-50 MG per tablet   Commonly known as:  SENOKOT-S;PERICOLACE   Take 2 tablets by mouth 2 times daily   Last time this was given:  2 tablets on 7/15/2018  8:37 AM   Next Dose Due:  Sunday evening  Take while on narcotics or so that your bowel movements are regular.

## 2018-07-13 NOTE — ANESTHESIA CARE TRANSFER NOTE
Patient: Luis M Joshi    Procedure(s):  Left total hip arthroplasty  Choice anesthesia - Wound Class: I-Clean    Diagnosis: DJD  Diagnosis Additional Information: No value filed.    Anesthesia Type:   General, ETT     Note:  Airway :Face Mask  Patient transferred to:PACU  Comments:   4/4, moving all extremities, pt follows commands, suctioned orally, extubated without complications.Pt tx to PACU, VSS, pt comfortable. Report given to  PACU RN.Handoff Report: Identifed the Patient, Identified the Reponsible Provider, Reviewed the pertinent medical history, Discussed the surgical course, Reviewed Intra-OP anesthesia mangement and issues during anesthesia, Set expectations for post-procedure period and Allowed opportunity for questions and acknowledgement of understanding      Vitals: (Last set prior to Anesthesia Care Transfer)    CRNA VITALS  7/13/2018 1414 - 7/13/2018 1452      7/13/2018             NIBP: (!)  137/97    NIBP Mean: 123                Electronically Signed By: LUDMILA Miguel CRNA  July 13, 2018  2:52 PM

## 2018-07-13 NOTE — ANESTHESIA PREPROCEDURE EVALUATION
Anesthesia Evaluation     .             ROS/MED HX    ENT/Pulmonary:     (+)sleep apnea, , . .    Neurologic:      (-) TIA, Other neuro hx and Dementia   Cardiovascular:        (-) hypertension, CAD, arrhythmias, pulmonary hypertension and dyslipidemia   METS/Exercise Tolerance:     Hematologic:        (-) anemia   Musculoskeletal:   (+) arthritis, , , -       GI/Hepatic:         Renal/Genitourinary:     (+) Nephrolithiasis ,       Endo:     (+) Obesity, .   (-) Type I DM, Type II DM, thyroid disease and chronic steroid usage   Psychiatric:        (-) psychiatric history   Infectious Disease:  - neg infectious disease ROS       Malignancy:      - no malignancy   Other:    (+) H/O Chronic Pain,                   Physical Exam      Airway   Mallampati: II  TM distance: >3 FB  Neck ROM: full    Dental     Cardiovascular   Rhythm and rate: regular and normal  (-) no murmur    Pulmonary    breath sounds clear to auscultation    Other findings: Lab Test        06/28/18     10/03/16     10/02/16                       1613          0636          1000          WBC          8.5          12.4*        12.3*         HGB          16.3         14.2         15.7          MCV          92.4         91           88            PLT          211          208          246            Lab Test        10/03/16     10/02/16     09/28/16                       0636          1000          0714          NA           137          137          140           POTASSIUM    4.7          3.6          3.8           CHLORIDE     104          101          108           CO2          27           29           25            BUN          12           13           16            CR           1.61*        1.35*        1.09          ANIONGAP     6            7            7             WAQAS          8.5          9.2          8.8           GLC          131*         124*         134*                        Anesthesia Plan      History & Physical Review  History and  physical reviewed and following examination; no interval change.    ASA Status:  2 .    NPO Status:  > 8 hours    Plan for General and ETT with Propofol induction. Maintenance will be Balanced.    PONV prophylaxis:  Ondansetron (or other 5HT-3) and Dexamethasone or Solumedrol  Additional equipment: Videolaryngoscope      Postoperative Care  Postoperative pain management:  IV analgesics and Oral pain medications.      Consents  Anesthetic plan, risks, benefits and alternatives discussed with:  Patient..                          .

## 2018-07-13 NOTE — IP AVS SNAPSHOT
[facility]    [address]    [facility] [address]    Phone:  [phone]    Fax:  [phone]                                       After Visit Summary   7/13/2018    Luis M Joshi    MRN: [id number]           After Visit Summary Signature Page     I have received my discharge instructions, and my questions have been answered. I have discussed any challenges I see with this plan with the nurse or doctor.    ..........................................................................................................................................  Patient/Patient Representative Signature      ..........................................................................................................................................  Patient Representative Print Name and Relationship to Patient    ..................................................               ................................................  Date                                            Time    ..........................................................................................................................................  Reviewed by Signature/Title    ...................................................              ..............................................  Date                                                            Time

## 2018-07-13 NOTE — ANESTHESIA POSTPROCEDURE EVALUATION
Patient: Luis M Joshi    Procedure(s):  Left total hip arthroplasty  Choice anesthesia - Wound Class: I-Clean    Diagnosis:DJD  Diagnosis Additional Information: Pre-operative diagnosis: DJD  Post-operative diagnosis same  Procedure: Procedure(s):  Left total hip arthroplasty  Choice anesthesia - Wound Class: I-Clean        Anesthesia Type:  General, ETT    Note:  Anesthesia Post Evaluation    Patient location during evaluation: PACU  Patient participation: Able to fully participate in evaluation  Level of consciousness: awake  Pain management: adequate  Airway patency: patent  Cardiovascular status: acceptable  Respiratory status: acceptable  Hydration status: euvolemic  PONV: controlled     Anesthetic complications: None          Last vitals:  Vitals:    07/13/18 1600 07/13/18 1615 07/13/18 1616   BP: 119/80 115/87    Pulse:      Resp: 12 11    Temp:      SpO2: 96% 96% 95%         Electronically Signed By: Francisco Cruz MD  July 13, 2018  4:16 PM

## 2018-07-13 NOTE — BRIEF OP NOTE
[facility] Brief Operative Note    Pre-operative diagnosis: DJD   Post-operative diagnosis same   Procedure: Procedure(s):  Left total hip arthroplasty  Choice anesthesia - Wound Class: I-Clean   Surgeon(s): Surgeon(s) and Role:     * Hari Bond MD - Primary     * Darlin Harrison PA-C - Assisting   Estimated blood loss: 250 mL    Specimens: * No specimens in log *   Findings: See dictation

## 2018-07-14 ENCOUNTER — APPOINTMENT (OUTPATIENT)
Dept: PHYSICAL THERAPY | Facility: CLINIC | Age: 53
DRG: 470 | End: 2018-07-14
Attending: ORTHOPAEDIC SURGERY
Payer: COMMERCIAL

## 2018-07-14 LAB
GLUCOSE SERPL-MCNC: 133 MG/DL (ref 70–99)
HGB BLD-MCNC: 12.1 G/DL (ref 13.3–17.7)

## 2018-07-14 PROCEDURE — 99207 ZZC NON-BILLABLE SERV PER CHARTING: CPT | Performed by: INTERNAL MEDICINE

## 2018-07-14 PROCEDURE — 97161 PT EVAL LOW COMPLEX 20 MIN: CPT | Mod: GP

## 2018-07-14 PROCEDURE — 12000000 ZZH R&B MED SURG/OB

## 2018-07-14 PROCEDURE — 82947 ASSAY GLUCOSE BLOOD QUANT: CPT | Performed by: ORTHOPAEDIC SURGERY

## 2018-07-14 PROCEDURE — 97116 GAIT TRAINING THERAPY: CPT | Mod: GP

## 2018-07-14 PROCEDURE — 36415 COLL VENOUS BLD VENIPUNCTURE: CPT | Performed by: ORTHOPAEDIC SURGERY

## 2018-07-14 PROCEDURE — 25000132 ZZH RX MED GY IP 250 OP 250 PS 637: Performed by: ORTHOPAEDIC SURGERY

## 2018-07-14 PROCEDURE — 25000128 H RX IP 250 OP 636: Performed by: ORTHOPAEDIC SURGERY

## 2018-07-14 PROCEDURE — 97110 THERAPEUTIC EXERCISES: CPT | Mod: GP

## 2018-07-14 PROCEDURE — 40000193 ZZH STATISTIC PT WARD VISIT

## 2018-07-14 PROCEDURE — 97530 THERAPEUTIC ACTIVITIES: CPT | Mod: GP

## 2018-07-14 PROCEDURE — 85018 HEMOGLOBIN: CPT | Performed by: ORTHOPAEDIC SURGERY

## 2018-07-14 RX ORDER — OXYCODONE AND ACETAMINOPHEN 5; 325 MG/1; MG/1
1-2 TABLET ORAL EVERY 4 HOURS PRN
Qty: 60 TABLET | Refills: 0 | Status: SHIPPED | OUTPATIENT
Start: 2018-07-14 | End: 2018-11-02

## 2018-07-14 RX ORDER — ASPIRIN 325 MG
325 TABLET, DELAYED RELEASE (ENTERIC COATED) ORAL 2 TIMES DAILY
Qty: 70 TABLET | Refills: 0 | Status: SHIPPED | OUTPATIENT
Start: 2018-07-14 | End: 2018-08-18

## 2018-07-14 RX ORDER — AMOXICILLIN 250 MG
2 CAPSULE ORAL 2 TIMES DAILY
Qty: 40 TABLET | Refills: 0 | Status: SHIPPED | OUTPATIENT
Start: 2018-07-14 | End: 2018-11-02

## 2018-07-14 RX ADMIN — OXYCODONE HYDROCHLORIDE 10 MG: 5 TABLET ORAL at 21:54

## 2018-07-14 RX ADMIN — OXYCODONE HYDROCHLORIDE 5 MG: 5 TABLET ORAL at 00:48

## 2018-07-14 RX ADMIN — SENNOSIDES AND DOCUSATE SODIUM 2 TABLET: 8.6; 5 TABLET ORAL at 21:54

## 2018-07-14 RX ADMIN — ONDANSETRON 4 MG: 2 INJECTION INTRAMUSCULAR; INTRAVENOUS at 06:02

## 2018-07-14 RX ADMIN — OXYCODONE HYDROCHLORIDE 10 MG: 5 TABLET ORAL at 13:14

## 2018-07-14 RX ADMIN — OXYCODONE HYDROCHLORIDE 10 MG: 5 TABLET ORAL at 10:24

## 2018-07-14 RX ADMIN — CEFAZOLIN SODIUM 2 G: 2 INJECTION, SOLUTION INTRAVENOUS at 06:02

## 2018-07-14 RX ADMIN — OXYCODONE HYDROCHLORIDE 5 MG: 5 TABLET ORAL at 04:03

## 2018-07-14 RX ADMIN — ACETAMINOPHEN 975 MG: 325 TABLET, FILM COATED ORAL at 09:02

## 2018-07-14 RX ADMIN — RANITIDINE 150 MG: 150 TABLET ORAL at 21:54

## 2018-07-14 RX ADMIN — ENOXAPARIN SODIUM 40 MG: 40 INJECTION SUBCUTANEOUS at 09:03

## 2018-07-14 RX ADMIN — ACETAMINOPHEN 975 MG: 325 TABLET, FILM COATED ORAL at 17:39

## 2018-07-14 RX ADMIN — OXYCODONE HYDROCHLORIDE 10 MG: 5 TABLET ORAL at 06:59

## 2018-07-14 RX ADMIN — RANITIDINE 150 MG: 150 TABLET ORAL at 09:03

## 2018-07-14 RX ADMIN — SENNOSIDES AND DOCUSATE SODIUM 1 TABLET: 8.6; 5 TABLET ORAL at 09:03

## 2018-07-14 RX ADMIN — OXYCODONE HYDROCHLORIDE 10 MG: 5 TABLET ORAL at 16:25

## 2018-07-14 RX ADMIN — HYDROXYZINE HYDROCHLORIDE 25 MG: 25 TABLET ORAL at 09:02

## 2018-07-14 RX ADMIN — HYDROXYZINE HYDROCHLORIDE 25 MG: 25 TABLET ORAL at 17:39

## 2018-07-14 RX ADMIN — ACETAMINOPHEN 975 MG: 325 TABLET, FILM COATED ORAL at 00:45

## 2018-07-14 RX ADMIN — CELECOXIB 200 MG: 200 CAPSULE ORAL at 21:54

## 2018-07-14 RX ADMIN — CELECOXIB 200 MG: 200 CAPSULE ORAL at 09:03

## 2018-07-14 ASSESSMENT — PAIN DESCRIPTION - DESCRIPTORS
DESCRIPTORS: ACHING

## 2018-07-14 NOTE — PLAN OF CARE
Problem: Patient Care Overview  Goal: Plan of Care/Patient Progress Review  Outcome: Improving  Pt alert and oriented. Full liquid diet. Tolerating well. No nausea.   Left hip aquacell CDI. CMS intact.   Pt dangled. And stood at bedside. Pillow is between legs.   Oxycodone for pain. 5/10.   VSS, LS clear. BS hypoactive.   Pt has home CPAP.   Capnography in place.  2L o2, 92%

## 2018-07-14 NOTE — CONSULTS
Consult Date:  07/14/2018      DATE OF ADMISSION:  07/13/2018      DATE OF CONSULTATION:  07/14/2018      REASON FOR CONSULTATION:  Postoperative medical management.      HISTORY OF PRESENT ILLNESS:  Mr. Joshi is a very pleasant, 53-year-old male with a history of obstructive sleep apnea for which he uses a CPAP and a history of left hip degenerative joint disease.  The patient underwent elective left total hip arthroplasty performed yesterday by Dr. Hari Bond of Orthopedic Surgery.  The patient is resting comfortably on the Medical/Surgical floor.  His pain has been controlled with pain medication.  He reports his left hip was quite stiff this morning when he got up, but this has improved substantially during the day today.  He just worked with Physical Therapy and feels he did quite well, getting up and mobilizing in the room.      In regards to medical history, he has a history of obstructive sleep apnea, which he uses CPAP at home.  He has no history of heart disease, heart attack, heart failure, blood clots, hypertension, or diabetes mellitus.      PAST MEDICAL HISTORY:     1.  Obstructive sleep apnea on CPAP nocturnally.   2.  Renal colic requiring laser lithotripsy in the past.   3.  Previous right ankle surgery.   4.  Degenerative joint disease of the left hip with surgery mentioned above, done yesterday.   5.  History of left knee degenerative joint disease.      OUTPATIENT MEDICATIONS:  None.      FAMILY HISTORY:  Reviewed.  Nothing contributory to this admission.        ALLERGIES:  NONE.        SOCIAL HISTORY:  No smoking.  He is a lifetime nonsmoker.  Social alcohol use, perhaps 4 beers a week.      REVIEW OF SYSTEMS:  Please see HPI for details.  Comprehensive greater than 10-point review of systems otherwise negative besides that detailed above.      PHYSICAL EXAMINATION:   VITAL SIGNS:  Blood pressure is currently 118/67 with a heart rate of 75.  Afebrile, saturation 92% on room air.   GENERAL:   The patient appears nontoxic, no acute distress.  Appears awake, alert and oriented x 3.  Mood and affect are normal and appropriate.  Good historian.   HEENT:  Head is  atraumatic.  Sclerae are white.  Eyelids are normal.  Conjunctivae normal.  Extraocular movements are intact.   NECK:  Supple.  No cervical or supraclavicular lymphadenopathy.   HEART:  Regular rate and rhythm.  No significant murmurs or lower extremity edema.   LUNGS:  Clear to auscultation bilaterally.  No intercostal retractions.  No conversational dyspnea.   ABDOMEN:  Nontender and nondistended.  Soft.  No masses.  No organomegaly.   EXTREMITIES:  No edema.   SKIN:  Reveals no rash.  No jaundice.  Skin is dry to touch.   NEUROLOGIC:  Cranial nerves II-XII are intact.  Moves extremities appropriately.  Sensation intact to light touch in the upper and lower extremities bilaterally.   PSYCHIATRIC:  The patient is awake, alert and oriented x 3.  Mood and affect are normal and appropriate.      LABORATORY AND  IMAGING DATA:  Glucose is 130.  Hemoglobin 12.1.  Creatinine normal.  Platelet count normal.      IMPRESSION AND PLAN:  Mr. Joshi is a 53-year-old male with a history of obstructive sleep apnea on CPAP and left hip degenerative joint disease.  The patient underwent left hip arthroplasty performed by Dr. Bond yesterday.  Hospitalist Service consulted for postoperative medical management.   1.  Status post left total hip arthroplasty:  Postoperative course including pain control and deep venous thrombosis prophylaxis per Orthopedic Surgery.  The patient currently appears comfortable on oral pain medications and is mobilizing with therapy.   2.  Obstructive sleep apnea history:  The patient uses CPAP at home.  He does not have his machine and is not interested at this time of using a hospital CPAP machine.  I told him that if he changes his mind to let the nurses know, and we can order that for him.   3.  Deep venous thrombosis prophylaxis  per Orthopedic Surgery.  Lovenox has been ordered.         JALEEL LECHUGA MD             D: 2018   T: 2018   MT: [place]      Name:     POPEYE VALENCIA   MRN:      [id number]        Account:       [id number]   :      1965           Consult Date:  2018      Document: [id number]

## 2018-07-14 NOTE — PROGRESS NOTES
"Orthopedic Surgery  7/14/2018    S: Patient voices no complaints today.     O: Blood pressure 117/76, pulse 75, temperature 97.3  F (36.3  C), temperature source Oral, resp. rate 16, height 1.803 m (5' 11\"), weight 110.7 kg (244 lb), SpO2 96 %.  Lab Results   Component Value Date    HGB 16.3 06/28/2018     No results found for: INR     Neurovascularly intact.  Calves are negative bilaterally, both soft and nontender.  The wound is C/D/I.  The wound looks good with minimal erythema of the surrounding skin.    A: Mr. Joshi is doing well status post Procedure(s):  ARTHROPLASTY HIP.    P: Continue physical therapy.   Anticoagulation   Pain management  Discharge planning    Hari Bond  [phone]    "

## 2018-07-14 NOTE — PLAN OF CARE
Problem: Patient Care Overview  Goal: Plan of Care/Patient Progress Review    VSS. Pain managed with oxycodone and atarax. Dressing CDI. CMS intact. +bs +gas -bm. Voiding. Plan for probable discharge tomorrow for continued pain control and mobility improvement. Will continue to monitor.

## 2018-07-14 NOTE — PROGRESS NOTES
07/14/18 0811   Quick Adds   Type of Visit Initial PT Evaluation   Living Environment   Lives With spouse   Number of Stairs to Enter Home 3   Number of Stairs Within Home 3   Transportation Available car;family or friend will provide   Self-Care   Equipment Currently Used at Home none   Activity/Exercise/Self-Care Comment Pt walks 5-7 miles per day b/t work and walking dog,    Functional Level Prior   Ambulation 0-->independent   Transferring 0-->independent   Toileting 0-->independent   Bathing 0-->independent   Dressing 0-->independent   Fall history within last six months no   Which of the above functional risks had a recent onset or change? none   General Information   Onset of Illness/Injury or Date of Surgery - Date 07/13/18   Pertinent History of Current Problem (include personal factors and/or comorbidities that impact the POC) POD #1 L LAURA   Precautions/Limitations fall precautions   Cognitive Status Examination   Orientation orientation to person, place and time   Pain Assessment   Patient Currently in Pain Yes, see Vital Sign flowsheet   Posture    Posture Forward head position;Protracted shoulders   Range of Motion (ROM)   ROM Comment WFL   Strength   Strength Comments Not formally assessed. Pt unable to tolerate much WB through LLE. Requires significant use of UE support on FWW in standing and when attempting to ambulate   Bed Mobility   Bed Mobility Comments mod A x 1 to manage LLE, slow speed, HOB elevated   Transfer Skills   Transfer Comments STS with FWW and min A x 1 from elevated EOB, heavy use of UE support required   Gait   Gait Comments Unable to successfully initiate 2/2 pain level, trialed 2 steps fwd   Balance   Balance Comments good sitting balance. pt requires BUE support in standing    Sensory Examination   Sensory Perception Comments intact to light touch. Denies N/T   Clinical Impression   Criteria for Skilled Therapeutic Intervention yes, treatment indicated   PT Diagnosis  "Decreased functional mobility   Influenced by the following impairments POD #1 L LAURA, pain   Functional limitations due to impairments Decreased functional mobility   Clinical Presentation Stable/Uncomplicated   Clinical Presentation Rationale age, active prior to surgery, requires assist for mobility    Therapy Frequency` 2 times/day   Predicted Duration of Therapy Intervention (days/wks) 3 days   Anticipated Discharge Disposition Home with Outpatient Therapy   Risk & Benefits of therapy have been explained Yes   Patient, Family & other staff in agreement with plan of care Yes   [facility] AM-PAC  \"6 Clicks\" V.2 Basic Mobility Inpatient Short Form   1. Turning from your back to your side while in a flat bed without using bedrails? 3 - A Little   2. Moving from lying on your back to sitting on the side of a flat bed without using bedrails? 3 - A Little   3. Moving to and from a bed to a chair (including a wheelchair)? 3 - A Little   4. Standing up from a chair using your arms (e.g., wheelchair, or bedside chair)? 3 - A Little   5. To walk in hospital room? 2 - A Lot   6. Climbing 3-5 steps with a railing? 2 - A Lot   Basic Mobility Raw Score (Score out of 24.Lower scores equate to lower levels of function) 16   Total Evaluation Time   Total Evaluation Time (Minutes) 15     "

## 2018-07-14 NOTE — PLAN OF CARE
Problem: Patient Care Overview  Goal: Plan of Care/Patient Progress Review    POD #1 L LAURA  Pt works in construction, active life style. Lives in split level home with wife. Pt's wife will be home to assist upon DC. Pt will need FWW. Denies falls in past 6 months      Discharge Planner PT   Patient plan for discharge: Home with assist and OP PT   Current status: Eval complete, treatment initiated. EDU PT role, POC, WBAT, no L hip precautions 2/2 surgical approach. Pt limited in tolerance to therapy by pain. Received pain meds prior to session ~ 1 hr. VSS throughout see flowsheet. Pt engaged in LAURA exercises prior to OOB activity. Pt is min-mod A x 1 for bed mobility, slow speed, heavy use of bed rail. STS with FWW from elevated bed, min A x 1, heavy use of UE push off and support on FWW once standing. Focus on standing wt shifts, attempted gait, 2 steps forward, pt demo difficulty with LLE knee/hip extension, limited by pain.   Barriers to return to prior living situation: Pain, A x 1   Recommendations for discharge: HOme with assist and OP PT   Rationale for recommendations: Predict pt will progress to be safe to DC home with FWW, assist from spouse, OP PT to progress functional mobility, strength, gait        Entered by: Yolande Cheng 07/14/2018 8:54 AM

## 2018-07-14 NOTE — PLAN OF CARE
Problem: Hip Arthroplasty (Total, Partial) (Adult)  Goal: Signs and Symptoms of Listed Potential Problems Will be Absent, Minimized or Managed (Hip Arthroplasty)  Signs and symptoms of listed potential problems will be absent, minimized or managed by discharge/transition of care (reference Hip Arthroplasty (Total, Partial) (Adult) CPG).   Outcome: Improving  A&Ox4, pleasant  LDA: PIV SL, IV ancef  Vitals stable, 2L/NC, capno on  Pain: R hip, oxy q3 & sched tylenol   CMS intact  Aquacel CDI  Wolfe DC'd, DTV  Regular diet, sched zofran  Followed by Ortho  Plan: PT & OT consults today  Will continue to monitor

## 2018-07-15 ENCOUNTER — APPOINTMENT (OUTPATIENT)
Dept: PHYSICAL THERAPY | Facility: CLINIC | Age: 53
DRG: 470 | End: 2018-07-15
Attending: ORTHOPAEDIC SURGERY
Payer: COMMERCIAL

## 2018-07-15 ENCOUNTER — APPOINTMENT (OUTPATIENT)
Dept: OCCUPATIONAL THERAPY | Facility: CLINIC | Age: 53
DRG: 470 | End: 2018-07-15
Attending: ORTHOPAEDIC SURGERY
Payer: COMMERCIAL

## 2018-07-15 VITALS
HEIGHT: 71 IN | DIASTOLIC BLOOD PRESSURE: 74 MMHG | OXYGEN SATURATION: 94 % | BODY MASS INDEX: 34.16 KG/M2 | RESPIRATION RATE: 16 BRPM | HEART RATE: 75 BPM | SYSTOLIC BLOOD PRESSURE: 126 MMHG | WEIGHT: 244 LBS | TEMPERATURE: 98.4 F

## 2018-07-15 LAB — HGB BLD-MCNC: 11.6 G/DL (ref 13.3–17.7)

## 2018-07-15 PROCEDURE — 97116 GAIT TRAINING THERAPY: CPT | Mod: GP | Performed by: PHYSICAL THERAPIST

## 2018-07-15 PROCEDURE — 40000133 ZZH STATISTIC OT WARD VISIT: Performed by: OCCUPATIONAL THERAPIST

## 2018-07-15 PROCEDURE — 40000193 ZZH STATISTIC PT WARD VISIT: Performed by: PHYSICAL THERAPIST

## 2018-07-15 PROCEDURE — 85018 HEMOGLOBIN: CPT | Performed by: ORTHOPAEDIC SURGERY

## 2018-07-15 PROCEDURE — 25000128 H RX IP 250 OP 636: Performed by: ORTHOPAEDIC SURGERY

## 2018-07-15 PROCEDURE — 97530 THERAPEUTIC ACTIVITIES: CPT | Mod: GO | Performed by: OCCUPATIONAL THERAPIST

## 2018-07-15 PROCEDURE — 97535 SELF CARE MNGMENT TRAINING: CPT | Mod: GO | Performed by: OCCUPATIONAL THERAPIST

## 2018-07-15 PROCEDURE — 97165 OT EVAL LOW COMPLEX 30 MIN: CPT | Mod: GO | Performed by: OCCUPATIONAL THERAPIST

## 2018-07-15 PROCEDURE — 25000132 ZZH RX MED GY IP 250 OP 250 PS 637: Performed by: ORTHOPAEDIC SURGERY

## 2018-07-15 PROCEDURE — 97530 THERAPEUTIC ACTIVITIES: CPT | Mod: GP | Performed by: PHYSICAL THERAPIST

## 2018-07-15 PROCEDURE — 97110 THERAPEUTIC EXERCISES: CPT | Mod: GP | Performed by: PHYSICAL THERAPIST

## 2018-07-15 PROCEDURE — 36415 COLL VENOUS BLD VENIPUNCTURE: CPT | Performed by: ORTHOPAEDIC SURGERY

## 2018-07-15 RX ADMIN — ACETAMINOPHEN 975 MG: 325 TABLET, FILM COATED ORAL at 01:31

## 2018-07-15 RX ADMIN — OXYCODONE HYDROCHLORIDE 10 MG: 5 TABLET ORAL at 13:48

## 2018-07-15 RX ADMIN — RANITIDINE 150 MG: 150 TABLET ORAL at 08:37

## 2018-07-15 RX ADMIN — OXYCODONE HYDROCHLORIDE 10 MG: 5 TABLET ORAL at 08:37

## 2018-07-15 RX ADMIN — CELECOXIB 200 MG: 200 CAPSULE ORAL at 08:37

## 2018-07-15 RX ADMIN — ENOXAPARIN SODIUM 40 MG: 40 INJECTION SUBCUTANEOUS at 08:37

## 2018-07-15 RX ADMIN — OXYCODONE HYDROCHLORIDE 10 MG: 5 TABLET ORAL at 01:32

## 2018-07-15 RX ADMIN — OXYCODONE HYDROCHLORIDE 10 MG: 5 TABLET ORAL at 04:31

## 2018-07-15 RX ADMIN — ACETAMINOPHEN 975 MG: 325 TABLET, FILM COATED ORAL at 10:54

## 2018-07-15 RX ADMIN — OXYCODONE HYDROCHLORIDE 10 MG: 5 TABLET ORAL at 17:24

## 2018-07-15 RX ADMIN — SENNOSIDES AND DOCUSATE SODIUM 2 TABLET: 8.6; 5 TABLET ORAL at 08:37

## 2018-07-15 ASSESSMENT — ACTIVITIES OF DAILY LIVING (ADL)
PREVIOUS_RESPONSIBILITIES: MEAL PREP;HOUSEKEEPING;LAUNDRY;SHOPPING;MEDICATION MANAGEMENT;FINANCES;DRIVING;WORK
IADL_COMMENTS: PT REPORTS I PRIOR TO ADMISSION.

## 2018-07-15 ASSESSMENT — PAIN DESCRIPTION - DESCRIPTORS
DESCRIPTORS: ACHING

## 2018-07-15 NOTE — PLAN OF CARE
Problem: Patient Care Overview  Goal: Plan of Care/Patient Progress Review    VSS. Pain managed with oxycodone and tylenol. Dressing CDI. CMS intact. +bs +gas -bm. Voiding. Plan for discharge today at 1800.

## 2018-07-15 NOTE — PROGRESS NOTES
Chart reviewed and d/w nurse.  Pt doing well.  Probable discharge today vs tomorrow    hospitalist service will sign off.  Please contact us with any questions

## 2018-07-15 NOTE — PROGRESS NOTES
Orthopedic Surgery  Luis M Joshi  7/15/2018  Admit Date:  2018  POD # 2  S/P left LAURA    Luis M is up lying in bed upon arrival.  Pain controlled.  Tolerating oral intake.  No events overnight. HE is working with PT/OT and states some soreness but overall happy with progression.     Alert and orient to person, place, and time.  Vital Sign Ranges  Temperature Temp  Av.2  F (36.2  C)  Min: 96.8  F (36  C)  Max: 97.8  F (36.6  C)   Blood pressure Systolic (24hrs), Av , Min:113 , Max:122        Diastolic (24hrs), Av, Min:60, Max:76      Pulse No Data Recorded   Respirations Resp  Av.3  Min: 16  Max: 18   Pulse oximetry SpO2  Av.5 %  Min: 92 %  Max: 98 %       Left hip wound is clean, dry, and intact, Aquacel is present. Minimal to no erythema of the surrounding skin.  Bilateral calves are soft, non-tender.  left lower extremity is NVI.  Left foot 5+ with plantar and dorsi flexion  3+ DP/PT pulse    Labs:  Recent Labs   Lab Test  10/03/16   0636  10/02/16   1000  16   0714   POTASSIUM  4.7  3.6  3.8     Recent Labs   Lab Test  07/15/18   0630  18   0630  18   1613   HGB  11.6*  12.1*  16.3     No results for input(s): INR in the last 53837 hours.  Recent Labs   Lab Test  18   1715  18   1613  10/03/16   0636   PLT  223  211  208       A/P  1. Plan   Continue  DVT prophylaxis.     Mobilize with PT/OT WBAT.     Continue current pain regiment.    2. Disposition   Anticipate d/c to home depending PT progression.    Jonathon Woodall PA-C

## 2018-07-15 NOTE — PLAN OF CARE
Problem: Patient Care Overview  Goal: Plan of Care/Patient Progress Review  Outcome: Adequate for Discharge Date Met: 07/15/18  RN from 1530 to 1833  Vss. CMS+ minor swelling in L hip. Dressing is CDI. Up with SBA and walker. Pain 3 or less took prn oxycodone. Dc instructions follow up care and meds were all reviewed prior to dc. filled rx of senna, percocet and asa were given to patient. dc to home.

## 2018-07-15 NOTE — PLAN OF CARE
Problem: Patient Care Overview  Goal: Plan of Care/Patient Progress Review  Orders for OT received; evaluation and treatment initiated. Pt has a history of obstructive sleep apnea for which he uses a CPAP and a history of left hip degenerative joint disease.  The patient underwent elective left total hip arthroplasty performed on 7/13/2018. Pt reports having some time off before returning to work and is able to be flexible with activity level within pt's job when transitioning back to work. Pt reports having supportive spouse that is able to assist within the home; spouse works from home and all pt's needs are met within first level of the home.     Discharge Planner OT   Patient plan for discharge: Home with assist   Current status:   Therapist provided education on energy conservation strategies and pt verbalized energy conservation strategies to use within the home and for returning to work. Pt transferred to EOB with SBA; requiring a break at EOB to rest before continuing. Pt transferred sit<>stand with CGA/SBA and transferred to w/c with SBA and FWW.  After therapist provided education on LB dressing; pt demonstrated I donning/doffing shorts. Pt transferred to walk-in shower with SBA and FWW. Therapist provided education on energy conservation strategies and environmental modification strategies to use within the shower. Pt transferred on/off toilet with SBA, FWW and grab bars; simulating similar set up within the home. Pt tolerated standing at sink with SBA and FWW and demonstrated I completing toileting cares and g/h at the sink. Pt transferred to w/c with SBA and FWW. Therapist provided education on safe mobility with walker and car transfer.   Barriers to return to prior living situation: Reduced endurance for I/ADL activities and pain.   Recommendations for discharge: Home with assist as needed for I/ADL activities and AE; extended shower sponge  Rationale for recommendations: Pt reports confidence  returning home and reports having supportive spouse who works from home and can assist as needed for I/ADL activities. Pt demonstrating SBA through transfers and able to verbalize energy conservation strategies to use within the home without cues from therapist.        Entered by: Mayuri Thomas 07/15/2018 10:45 AM    Occupational Therapy Discharge Summary    Reason for therapy discharge:    All goals and outcomes met, no further needs identified.    Progress towards therapy goal(s). See goals on Care Plan in [facility] electronic health record for goal details.  Goals met    Therapy recommendation(s):    No further therapy is recommended.

## 2018-07-15 NOTE — PROGRESS NOTES
07/15/18 0750   Quick Adds   Type of Visit Initial Occupational Therapy Evaluation   Living Environment   Lives With spouse;child(leonor), adult   Living Arrangements house   Number of Stairs to Enter Home 6   Transportation Available car;family or friend will provide   Living Environment Comment Pt lives with spouse and adult child in a home; pt reports all needs will be met within first level of home. Pt reports having a walk in shower and raised toilet seat with handles within bathroom.    Self-Care   Dominant Hand right   Usual Activity Tolerance good   Current Activity Tolerance good   Equipment Currently Used at Home crutches;raised toilet;walker, rolling   Activity/Exercise/Self-Care Comment Pt reports being a  for company; pt will take a week off and slowly transition back into work. Pt is required to walk extensively at work; ~8 miles per day.    Functional Level Prior   Ambulation 0-->independent   Transferring 0-->independent   Toileting 0-->independent   Bathing 0-->independent   Dressing 0-->independent   Eating 0-->independent   Communication 0-->understands/communicates without difficulty   Swallowing 0-->swallows foods/liquids without difficulty   Cognition 0 - no cognition issues reported   Fall history within last six months yes   Number of times patient has fallen within last six months 1   Which of the above functional risks had a recent onset or change? ambulation;transferring;bathing   Prior Functional Level Comment Pt reports I prior to admission.    General Information   Onset of Illness/Injury or Date of Surgery - Date 07/13/18   Referring Physician Hari Bond MD   Patient/Family Goals Statement Home with assist    Additional Occupational Profile Info/Pertinent History of Current Problem Pt has a history of obstructive sleep apnea for which he uses a CPAP and a history of left hip degenerative joint disease.  The patient underwent elective left total hip  arthroplasty performed on 7/13/2018. Pt reports having some time off before returning to work and is able to be flexible with activity level within pt's job when transitioning back to work. Pt reports having supportive spouse that is able to assist within the home; spouse works from home and all pt's needs are met within first level of the home.    Weight-Bearing Status - LLE weight-bearing as tolerated   Weight-Bearing Status - RLE full weight-bearing   General Info Comments Pt reports spouse is able to assist with d/c home; spouse works from home.    Cognitive Status Examination   Orientation orientation to person, place and time   Level of Consciousness alert   Able to Follow Commands WNL/WFL   Personal Safety (Cognitive) WNL/WFL   Memory intact   Cognitive Comment No deficits identified during initial evaluation.    Visual Perception   Visual Perception No deficits were identified   Visual Perception Comments No deficits were identified   Pain Assessment   Patient Currently in Pain Yes, see Vital Sign flowsheet  (Reports 3)   Posture   Posture Comments No deficits identified during initial evaluation.    Range of Motion (ROM)   ROM Comment No hip precautions    Strength   Strength Comments Reduced strength and endurance    Hand Strength   Hand Strength Comments No deficits identified during initial evaluation.    Coordination   Upper Extremity Coordination No deficits were identified   Coordination Comments No deficits identified during initial evaluation   Mobility   Bed Mobility Comments Initiated; review daily note for more detail.    Transfer Skills   Transfer Comments Initiated; review daily note for more detail.    Transfer Skill: Bed to Chair/Chair to Bed   Level of [place]: Bed to Chair stand-by assist   Physical Assist/Nonphysical Assist: Bed to Chair supervision   Weight-Bearing Restrictions weight-bearing as tolerated   Assistive Device - Transfer Skill Bed to Chair Chair to Bed Rehab Eval  rolling walker   Transfer Skill: Sit to Stand   Level of [place]: Sit/Stand stand-by assist   Physical Assist/Nonphysical Assist: Sit/Stand supervision   Transfer Skill: Sit to Stand weight-bearing as tolerated   Assistive Device for Transfer: Sit/Stand rolling walker   Toilet Transfer   Toilet Transfer Comments Initiated; review daily note for more detail.    Transfer Skill: Toilet Transfer   Level of [place]: Toilet stand-by assist   Physical Assist/Nonphysical Assist: Toilet supervision   Weight-Bearing Restrictions: Toilet weight-bearing as tolerated   Assistive Device rolling walker;grab bars   Toilet Transfer Skill Comments Initiated; review daily note for more detail.    Tub/Shower Transfer   Tub/Shower Transfer Comments Initiated; review daily note for more detail.    Balance   Balance Comments No LOB observed during initial evaluation; pt did require FWW for stability which is not a pt's baseline.    Lower Body Dressing   Level of [place]: Dress Lower Body stand-by assist   Physical Assist/Nonphysical Assist: Dress Lower Body supervision   Toileting   Level of [place]: Toilet independent   Grooming   Level of [place]: Grooming stand-by assist   Physical Assist/Nonphysical Assist: Grooming supervision   Eating/Self Feeding   Level of [place]: Eating independent   Instrumental Activities of Daily Living (IADL)   Previous Responsibilities meal prep;housekeeping;laundry;shopping;medication management;finances;driving;work   IADL Comments Pt reports I prior to admission.    Activities of Daily Living Analysis   Impairments Contributing to Impaired Activities of Daily Living balance impaired;ROM decreased;strength decreased   General Therapy Interventions   Planned Therapy Interventions ADL retraining;IADL retraining;progressive activity/exercise   Clinical Impression   Criteria for Skilled Therapeutic Interventions Met yes, treatment indicated   OT Diagnosis Reduced I/ADL activity  "tolerance.    Influenced by the following impairments Impaired balance, decreased ROM, and reduced endurance and strength.    Assessment of Occupational Performance 3-5 Performance Deficits   Identified Performance Deficits Driving, functional mobility, showering, dressing, work   Clinical Decision Making (Complexity) Low complexity   Therapy Frequency daily   Predicted Duration of Therapy Intervention (days/wks) 1x evaluation and treatment    Anticipated Equipment Needs at Discharge bath sponge   Anticipated Discharge Disposition Home with Assist   Risks and Benefits of Treatment have been explained. Yes   Patient, Family & other staff in agreement with plan of care Yes   [facility] TM \"6 Clicks\"   2016, Trustees of [facility], under license to [employer].  All rights reserved.   6 Clicks Short Forms Daily Activity Inpatient Short Form   [facility]  \"6 Clicks\" Daily Activity Inpatient Short Form   1. Putting on and taking off regular lower body clothing? 3 - A Little   2. Bathing (including washing, rinsing, drying)? 3 - A Little   3. Toileting, which includes using toilet, bedpan or urinal? 3 - A Little   4. Putting on and taking off regular upper body clothing? 4 - None   5. Taking care of personal grooming such as brushing teeth? 4 - None   6. Eating meals? 4 - None   Daily Activity Raw Score (Score out of 24.Lower scores equate to lower levels of function) 21     "

## 2018-07-15 NOTE — DISCHARGE INSTRUCTIONS
Call surgeon or primary care md before or after your follow appointment if any of these issues occur:  1.Uncontrolled/persistant temperature, chills, sweats. Temp >101  2. Uncontrolled pain despite pain medication  3. Numbness or tingling in operative leg, weakness, falls  4. Increased/persistant bleeding,redness,swelling, bruising, drainage (yellow/odourous), or bleeding from or around incision or incision pulling apart.  5. Dizziness, lightheaded (could be pain meds)  6. Calf pain, hard/swollen/warm area, chest pain or shortness of breath   7. Constipation despite taking over the counter stool softner and laxative for constipation   8. Trouble voiding or signs or symptoms of urinary tract infection  9. Uncontrolled bleeding (nose bleed, incision)  10.  if unable to wake call 911    Please have all family and caregivers review this information.    Other instructions:  See general discharge instruction sheet for hips.  1. Do exercises as instructed by therapist-slowly increased activity as pain tolerates  2.   3. Walk daily increasing distance and time each day by a little.  4. Ice hip for swelling and discomfort 3-4 times daily for 20 minutes  5.Notify your dentist of your implant so you can get antibiotics before any dental work or for any other invasive procedure.  6. Take over the counter stool softener (mirilax, senna, colace) while on narcotics to prevent constipation so bowel movements are regular., take laxative (milk of magnesia) or a suppository if no bm in 2-3 days (take as directed)  7.  8.Keep track of when you take all medication, especially pain medication. See bottles for instructions and side effects  9. Incentive spirometer hourly to help prevent pneumonia  10. See medication handouts for medication information and side effects  11. DO NOT DRINK alcohol or DRIVE on narcotic pain medication.    Friends and family please read and review all discharge information and medication sheet    IF unable to  wake call 911    Dressing information:Do not change dressing  Can shower with aquacell dressing, it is waterproof-do not remove the dressing will be changed at the follow up apt. with your suregon  Inspect surrounding skin daily for s/s of infections.   Do not soak in tub or pool.   If dressing  Loosens wash hands and tape edge down then call surgeon for direction-do not touch incision   If dressing is 1/2 or more full of drainage or leaking call your suregon  If dressing is half full of drainage call your surgeon      Follow up Appointment:  Make follow up apt. 10-14 days post surgery,call to make apt. [phone]. Call the same number with any questions or issues prior to or after apt.

## 2018-07-15 NOTE — PLAN OF CARE
Problem: Patient Care Overview  Goal: Plan of Care/Patient Progress Review      Discharge Planner PT   Patient plan for discharge: Home with assist and OP PT   Current status: Pt completes sit<>supine with SBA and increased time. Pt completes sit<>stand Beth. Pt ambulates 300' with use of FWW and CGA progressing to SBA progressing to Beth. Pt able to progress from step-to to step-through gait pattern. Pt completes stairs with use of single handrail and cane with SBA. Pt tolerates session well.     PM session: Pt has demonstrated the ability to safely complete all mobility/transfer with Beth and SBA including stairs. All questions answered regarding mobility at discharge. Pt reports feeling confident regarding discharge home. No further IPPT needs.   Barriers to return to prior living situation: None anticipated  Recommendations for discharge: Home   Rationale for recommendations: Pt has demonstrated the ability to complete all mobility safely.        Entered by: Elzbieta Higgins 07/15/2018 10:44 AM         Physical Therapy Discharge Summary    Reason for therapy discharge:    Discharged to home.    Progress towards therapy goal(s). See goals on Care Plan in [facility] electronic health record for goal details.  Goals met    Therapy recommendation(s):    Continue home exercise program.

## 2018-07-16 ENCOUNTER — DOCUMENTATION ONLY (OUTPATIENT)
Dept: OTHER | Facility: CLINIC | Age: 53
End: 2018-07-16

## 2018-07-17 ENCOUNTER — CARE COORDINATION (OUTPATIENT)
Dept: FAMILY MEDICINE | Facility: CLINIC | Age: 53
End: 2018-07-17

## 2018-07-17 NOTE — DISCHARGE SUMMARY
"Discharge Summary    Luis M Joshi MRN# [id number]   YOB: 1965 Age: 53 year old     Date of Admission: 7/13/2018    Date of Discharge: 7/15/2018    Reason for Admission: Luis M Joshi is an 53 year old male who was admitted to the hospital following surgery with Dr. Hari Bond.    Preoperative Diagnosis: DJD    Postoperative Diagnosis: DJD    Procedure Completed: left total hip arthroplasty     Hospital Course:  Mr. Joshi was admitted and underwent the above procedure. The patient tolerated the procedure well. There were no complications. Following surgery he was admitted to the floor.  During his stay he did not require any blood transfusions.  His last hemoglobin was noted to be   Lab Results   Component Value Date    HGB 11.6 07/15/2018   .  His pain was controlled with oral pain medication.  During his stay he progressed well in physical therapy and all the therapy goals were met.     Discharge Physical Exam:  /74 (BP Location: Left arm)  Pulse 75  Temp 98.4  F (36.9  C) (Oral)  Resp 16  Ht 1.803 m (5' 11\")  Wt 110.7 kg (244 lb)  SpO2 94%  BMI 34.03 kg/m2  Neurovascularly intact, distal pulses present bilaterally.  Calves are negative bilaterally, both soft and nontender.  The wound looks good with minimal erythema of the surrounding skin.    Assessment: Mr. Joshi is stable and doing well status post left total hip arthroplasty on POD #2.    Plan: We will discharge him home on analgesics and deep venous thrombosis prophylaxis.  He will follow-up with Darlin Harrison PA-C or Dr. Hari Bond approximately 2 weeks from surgery.  He may call [phone] to schedule an appointment.    Discharge Instructions:  Discharge diet: Regular   Discharge activity: Weight bear as tolerated.  Advance from the walker to a cane as tolerated.  Discontinue the use of cane when comfortable.   Physical therapy: Work on therapy exercises given in the hospital.   Discharge follow-up: Follow up " with Darlin Harrison PA-C in 10-14 days.   Anticoagulation Asprin 325 mg twice daily for 5 weeks.   Wound care: Leave aquacel dressing in place until post-op follow-up.  If it becomes loose or soiled then remove and replace with daily dry dressings.  Keep wound clean and dry.  May get incision area wet in shower but do not soak or scrub.         Meds:   Luis M Joshi   Home Medication Instructions LOPEZ:[id number]    Printed on:07/17/18 1216   Medication Information                      aspirin 325 MG EC tablet  Take 1 tablet (325 mg) by mouth 2 times daily             order for DME  Equipment being ordered: Walker Wheels () and Walker ()  Treatment Diagnosis: Gait instability             oxyCODONE-acetaminophen (PERCOCET) 5-325 MG per tablet  Take 1-2 tablets by mouth every 4 hours as needed for pain             senna-docusate (SENOKOT-S;PERICOLACE) 8.6-50 MG per tablet  Take 2 tablets by mouth 2 times daily                     Darlin Harrison PA-C  O: [phone]

## 2018-07-17 NOTE — PROGRESS NOTES
Care Coordination Assessment    PCP: Ana Paula Mejia    Referral Source:  ED/IP List      Clinical Data; Patient discharged from inpatient at [facility] 07/15/2018 S/P total hip replacement.  Patient discharged home with instructions to follow up with surgeon in 10-14 days    Plan: I will forward to clinical support to assess and assist with appropriate follow up

## 2018-11-02 ENCOUNTER — OFFICE VISIT (OUTPATIENT)
Dept: FAMILY MEDICINE | Facility: CLINIC | Age: 53
End: 2018-11-02

## 2018-11-02 VITALS
WEIGHT: 247 LBS | BODY MASS INDEX: 34.58 KG/M2 | TEMPERATURE: 97.8 F | HEIGHT: 71 IN | SYSTOLIC BLOOD PRESSURE: 128 MMHG | DIASTOLIC BLOOD PRESSURE: 82 MMHG | HEART RATE: 73 BPM | OXYGEN SATURATION: 97 %

## 2018-11-02 DIAGNOSIS — Z23 NEED FOR VACCINATION: ICD-10-CM

## 2018-11-02 DIAGNOSIS — S83.207D TEAR OF LEFT MENISCUS AS CURRENT INJURY, SUBSEQUENT ENCOUNTER: ICD-10-CM

## 2018-11-02 DIAGNOSIS — Z87.442 HISTORY OF KIDNEY STONES: ICD-10-CM

## 2018-11-02 DIAGNOSIS — Z96.642 STATUS POST TOTAL REPLACEMENT OF LEFT HIP: ICD-10-CM

## 2018-11-02 DIAGNOSIS — Z01.810 PRE-OPERATIVE CARDIOVASCULAR EXAMINATION: Primary | ICD-10-CM

## 2018-11-02 LAB
ERYTHROCYTE [DISTWIDTH] IN BLOOD BY AUTOMATED COUNT: 14.1 %
HCT VFR BLD AUTO: 46.9 % (ref 40–53)
HEMOGLOBIN: 15.7 G/DL (ref 13.3–17.7)
MCH RBC QN AUTO: 28.4 PG (ref 26–33)
MCHC RBC AUTO-ENTMCNC: 33.5 G/DL (ref 31–36)
MCV RBC AUTO: 84.9 FL (ref 78–100)
PLATELET COUNT - QUEST: 185 10^9/L (ref 150–375)
RBC # BLD AUTO: 5.52 10*12/L (ref 4.4–5.9)
WBC # BLD AUTO: 7.4 10*9/L (ref 4–11)

## 2018-11-02 PROCEDURE — 85027 COMPLETE CBC AUTOMATED: CPT | Performed by: PHYSICIAN ASSISTANT

## 2018-11-02 PROCEDURE — 99214 OFFICE O/P EST MOD 30 MIN: CPT | Performed by: PHYSICIAN ASSISTANT

## 2018-11-02 PROCEDURE — 36415 COLL VENOUS BLD VENIPUNCTURE: CPT | Performed by: PHYSICIAN ASSISTANT

## 2018-11-02 NOTE — PROGRESS NOTES
[facility]  [address]  [address]  [facility] [address]  [phone]  Dept: [phone]    PRE-OP EVALUATION:  Today's date: 2018    Luis M Joshi (: 1965) presents for pre-operative evaluation assessment as requested by Dr. Bond.  He requires evaluation and anesthesia risk assessment prior to undergoing surgery/procedure for treatment of left knee likely meniscus tear.    Proposed Surgery/ Procedure: left knee  Date of Surgery/ Procedure: 2018  Time of Surgery/ Procedure: PM  Hospital/Surgical Facility: [facility]  Primary Physician: Ana Paula Mejia  Type of Anesthesia Anticipated: to be determined    Patient has a Health Care Directive or Living Will:  YES    1. NO - Do you have a history of heart attack, stroke, stent, bypass or surgery on an artery in the head, neck, heart or legs?  2. NO - Do you ever have any pain or discomfort in your chest?  3. NO - Do you have a history of  Heart Failure?  4. NO - Are you troubled by shortness of breath when: walking on the level, up a slight hill or at night?  5. NO - Do you currently have a cold, bronchitis or other respiratory infection?  6. NO - Do you have a cough, shortness of breath or wheezing?  7. NO - Do you sometimes get pains in the calves of your legs when you walk?  8. NO - Do you or anyone in your family have previous history of blood clots?  9. NO - Do you or does anyone in your family have a serious bleeding problem such as prolonged bleeding following surgeries or cuts?  10. NO - Have you ever had problems with anemia or been told to take iron pills?  11. NO - Have you had any abnormal blood loss such as black, tarry or bloody stools, or abnormal vaginal bleeding?  12. NO - Have you ever had a blood transfusion?  13. NO - Have you or any of your relatives ever had problems with anesthesia?  14. YES - DO YOU HAVE SLEEP APNEA, EXCESSIVE SNORING OR DAYTIME  DROWSINESS? Has PANKAJ, uses CPAP.  15. YES - DO YOU HAVE PROSTHETIC JOINTS? Left total hip replacement 7/2018.  16. NO - Do you have prosthetic heart valves?  17. NO - Is there any chance that you may be pregnant?      HPI:     HPI related to upcoming procedure:   Left knee has been having pain for 10 years. Pain is getting worse and worse. X-ray and MRIs per patient showed likely meniscus tear that will be corrected    See problem list for active medical problems.  Problems all longstanding and stable, except as noted/documented.  See ROS for pertinent symptoms related to these conditions.                                                                                                          MEDICAL HISTORY:     Patient Active Problem List    Diagnosis Date Noted     History of kidney stones 11/02/2018     Priority: Medium     S/P total hip arthroplasty 07/13/2018     Priority: Medium     7/2018       PANKAJ (obstructive sleep apnea) - dg 2017 06/28/2018     Priority: Medium     Elevated fasting glucose 06/28/2018     Priority: Medium     Renal colic 10/02/2016     Priority: Medium     ACP (advance care planning) 06/28/2018     Priority: Low     Health Care Home 06/28/2018     Priority: Low      Past Medical History:   Diagnosis Date     Kidney stones      Sleep apnea     CPAP will bring on the day of surgery.     Past Surgical History:   Procedure Laterality Date     ARTHROPLASTY HIP Left 7/13/2018    Procedure: ARTHROPLASTY HIP;  Left total hip arthroplasty using a Biomet Taperloc femoral stem, G7 acetabulum;  Surgeon: Hari Bond MD;  Location:  OR     C OPEN RX ANKLE DISLOCATN+FIXATN Right 2016     LASER HOLMIUM LITHOTRIPSY URETER(S), INSERT STENT, COMBINED Right 10/3/2016    Procedure: COMBINED CYSTOSCOPY, URETEROSCOPY, LASER HOLMIUM LITHOTRIPSY URETER(S), INSERT STENT;  Surgeon: Trenton Calvin MD;  Location:  OR     No current outpatient prescriptions on file.     OTC products: None,  "except as noted above    No Known Allergies   Latex Allergy: No    Social History   Substance Use Topics     Smoking status: Never Smoker     Smokeless tobacco: Never Used     Alcohol use 2.4 oz/week     4 Standard drinks or equivalent per week     History   Drug Use No       REVIEW OF SYSTEMS:   Constitutional, neuro, ENT, endocrine, pulmonary, cardiac, gastrointestinal, genitourinary, musculoskeletal, integument and psychiatric systems are negative, except as otherwise noted.    EXAM:   /82 (BP Location: Right arm, Patient Position: Sitting, Cuff Size: Adult Large)  Pulse 73  Temp 97.8  F (36.6  C) (Oral)  Ht 1.803 m (5' 11\")  Wt 112 kg (247 lb)  SpO2 97%  BMI 34.45 kg/m2    GENERAL APPEARANCE: healthy, alert and no distress     EYES: EOMI,  PERRL     HENT: ear canals and TM's normal and nose and mouth without ulcers or lesions     NECK: no adenopathy, no asymmetry, masses, or scars and thyroid normal to palpation     RESP: lungs clear to auscultation - no rales, rhonchi or wheezes     CV: regular rates and rhythm, normal S1 S2, no S3 or S4 and no murmur, click or rub     ABDOMEN:  soft, nontender, no HSM or masses and bowel sounds normal     MS: extremities normal- no gross deformities noted, no evidence of inflammation in joints, FROM in all extremities.     SKIN: no suspicious lesions or rashes     NEURO: Normal strength and tone, sensory exam grossly normal, mentation intact and speech normal     PSYCH: mentation appears normal. and affect normal/bright     LYMPHATICS: No cervical adenopathy    DIAGNOSTICS:   EKG: Not indicated due to non-vascular surgery and low risk of event (age <65 and without cardiac risk factors)  Hemoglobin (indicated for history of anemia or procedure with significant blood loss such as tonsillectomy, major intraperitoneal surgery, vascular surgery, major spine surgery, total joint replacement)      Office Visit on 11/02/2018   Component Date Value Ref Range Status     WBC " 11/02/2018 7.4  4.0 - 11 10*9/L Final     RBC Count 11/02/2018 5.52  4.4 - 5.9 10*12/L Final     Hemoglobin 11/02/2018 15.7  13.3 - 17.7 g/dL Final     Hematocrit 11/02/2018 46.9  40.0 - 53.0 % Final     MCV 11/02/2018 84.9  78 - 100 fL Final     MCH 11/02/2018 28.4  26 - 33 pg Final     MCHC 11/02/2018 33.5  31 - 36 g/dL Final     RDW 11/02/2018 14.1  % Final     Platelet Count 11/02/2018 185  150 - 375 10^9/L Final       IMPRESSION:   Reason for surgery/procedure: Left knee meniscus tear  Diagnosis/reason for consult: Pre-operative examination    The proposed surgical procedure is considered INTERMEDIATEINTERMEDIATE risk.    REVISED CARDIAC RISK INDEX  The patient has the following serious cardiovascular risks for perioperative complications such as (MI, PE, VFib and 3  AV Block):  No serious cardiac risks  INTERPRETATION: 1 risks: Class II (low risk - 0.9% complication rate)    The patient has the following additional risks for perioperative complications:  No identified additional risks      ICD-10-CM    1. Pre-operative cardiovascular examination Z01.810 VENOUS COLLECTION     HEMOGRAM/PLATELET (BFP)   2. Tear of left meniscus as current injury, subsequent encounter S83.207D VENOUS COLLECTION     HEMOGRAM/PLATELET (BFP)   3. History of kidney stones Z87.442    4. Status post total replacement of left hip Z96.642    5. Need for vaccination Z23 CANCELED: HC FLU VAC PRESRV FREE QUAD SPLIT VIR 3+YRS IM     CANCELED: VACCINE ADMINISTRATION, INITIAL     CANCELED: VACCINE ADMINISTRATION, EACH ADDITIONAL     CANCELED: C TD PRSERV FREE >=7 YRS ADS IM       RECOMMENDATIONS:     --Patient is to take all scheduled medications on the day of surgery EXCEPT for modifications listed below.    APPROVAL GIVEN to proceed with proposed procedure, without further diagnostic evaluation    1. Seven days before surgery do not take Aspirin or any over-the-counter pain medications other than Tylenol.  TYLENOL is the safest pain pill to  use before surgery because it does not affect your bleeding time. If tylenol is not sufficient for pain control talk to me or the surgeon and we will decide what is safe to use.    2. Do not eat anything after midnight  (elio of the surgery) and nothing the morning of the surgery.    3. Medications: Does not take any daily medications.     4. Follow all instructions given by the surgery team. They usually give out a packet. Read it and please follow it precisely. This helps surgical experience and outcomes.    5. If you have any questions do not hesitate to call me or the surgeon/surgical team.      Marge Davila PA-C  [facility]         Signed Electronically by: Marge Davila PA-C    Copy of this evaluation report is provided to requesting physician.    [facility] Preop Guidelines    Revised Cardiac Risk Index

## 2018-11-02 NOTE — MR AVS SNAPSHOT
"              After Visit Summary   11/2/2018    Luis M Joshi    MRN: [id number]           Patient Information     Date Of Birth          1965        Visit Information        Provider Department      11/2/2018 11:30 AM Marge Davila PA-C [facility]        Today's Diagnoses     Pre-operative cardiovascular examination    -  1    Tear of left meniscus as current injury, subsequent encounter        History of kidney stones        Status post total replacement of left hip        Need for vaccination           Follow-ups after your visit        Who to contact     If you have questions or need follow up information about today's clinic visit or your schedule please contact [facility] directly at [phone].  Normal or non-critical lab and imaging results will be communicated to you by MyChart, letter or phone within 4 business days after the clinic has received the results. If you do not hear from us within 7 days, please contact the clinic through MyChart or phone. If you have a critical or abnormal lab result, we will notify you by phone as soon as possible.  Submit refill requests through [employer] or call your pharmacy and they will forward the refill request to us. Please allow 3 business days for your refill to be completed.          Additional Information About Your Visit        Care EveryWhere ID     This is your Care EveryWhere ID. This could be used by other organizations to access your [facility] medical records  [id number]        Your Vitals Were     Pulse Temperature Height Pulse Oximetry BMI (Body Mass Index)       73 97.8  F (36.6  C) (Oral) 1.803 m (5' 11\") 97% 34.45 kg/m2        Blood Pressure from Last 3 Encounters:   11/02/18 128/82   07/15/18 126/74   06/28/18 126/82    Weight from Last 3 Encounters:   11/02/18 112 kg (247 lb)   07/13/18 110.7 kg (244 lb)   06/28/18 111.1 kg (245 lb)              We Performed the Following     " HEMOGRAM/PLATELET (BFP)     VENOUS COLLECTION        Primary Care Provider Office Phone # Fax #    SALMA Saeed [phone] [address]  [facility] [address]        Equal Access to Services     ISABELLA REARDON : Hadii yecenia spencer hadpericoo Soomaali, waaxda luqadaha, qaybta kaalmada adeegyada, waxcaitie idiin hayfranckn adejaimee richkrista fisher. So [facility] [phone].    ATENCIÓN: Si habla español, tiene a mays disposición servicios gratuitos de asistencia lingüística. Llame al [phone].    We comply with applicable federal civil rights laws and [place] laws. We do not discriminate on the basis of race, color, national origin, age, disability, sex, sexual orientation, or gender identity.            Thank you!     Thank you for choosing [facility]  for your care. Our goal is always to provide you with excellent care. Hearing back from our patients is one way we can continue to improve our services. Please take a few minutes to complete the written survey that you may receive in the mail after your visit with us. Thank you!             Your Updated Medication List - Protect others around you: Learn how to safely use, store and throw away your medicines at www.disposemymeds.org.      Notice  As of 11/2/2018 12:14 PM    You have not been prescribed any medications.

## 2019-05-11 NOTE — OP NOTE
Procedure Date: 07/13/2018      PREOPERATIVE DIAGNOSIS:  Left hip primary osteoarthritis.      POSTOPERATIVE DIAGNOSIS:  Left hip primary osteoarthritis.      PROCEDURE:  Left total hip arthroplasty using a Biomet Taperloc femoral stem, G7 acetabulum.      SURGEON:  Christian Bond MD      ASSISTANT:  Darlin Harrison PA-C      INDICATIONS:  Mr. Joshi is a very pleasant 53-year-old gentleman who has had ongoing left hip pain for some time, now has failed conservative management with oral analgesics, activity modifications, and now wishes to proceed with operative intervention.  We had a long discussion of risks, benefits, and alternatives of total hip arthroplasty.  He understands these and wished to proceed with surgery.      NARRATIVE EVENTS:  After thorough evaluation and proper identification of the patient to be operated on, Mr. Joshi was taken to the operating room.  He underwent a general anesthetic, placed in the left lateral decubitus position on the operating table, left hip up.  Left hip was prepped and draped in the usual sterile manner.  After appropriate surgical pause to confirm the patient's extremity to be operated on, the patient received 2 grams of Ancef.  His left hip was approached through a lateral incision centered over the greater trochanter.  Skin and soft tissue were resected down to the tensor fascia.  The fascia was split in line with the fibers in line with the femur taken down the trochanteric bursa.  Bursal tissue was removed.  The anterior one-third of the gluteus medius removed off the greater trochanter in a modified Hardinge fashion.  This took us down onto the joint capsule which was T'd.  The femoral neck was identified, and the femoral head was surgically dislocated from the acetabulum.        We then made our femoral neck osteotomy 12 mm proximal to the lesser trochanter in accordance with our preoperative templating, removed the femoral head from the field.  We then exposed the  acetabulum, removed the ligament of teres, transverse acetabular ligament and labrum, sequentially reamed the acetabulum up to fit a size 52 Biomet G7 acetabulum.  We impacted this into position.  We had good stability and approximately 40 degrees abduction, 20 degrees of anteversion.  We had good primary stability.  We augmented this with 2 screws, 1 in the inner table of the pelvis, 1 in the posterior column.  Once this was done, we removed the osteophytes around the posterior inferior and anterior inferior aspect of the acetabulum, and then impacted our polyethylene liner into place with a flat liner to fit a size 36 mm femoral head.        At this point, we then thoroughly irrigated the hip with both saline and IrriSept solution.  We paid attention to the femur.  He removed the bone from the piriformis fossa, used entry reamer liner and sequentially broached up to fit a size 12 Biomet Taperloc femoral stem with a extended offset neck.  We found that when trial and extended the neck offset helped to restore appropriate soft tissue tension and balance with a +0, 36 mm femoral head.  So, at this point the decision was made to place our final implants into position, a size 12 Taperloc femoral stem with extended neck offset.  Once this was solidly into position, we retrialed our femoral heads and found that a +0 femoral head gave the best stability and full range of motion, and appropriate leg length and soft tissue tension, so the 36 mm Biolox ceramic femoral head was impacted onto the trunnion with a 0 neck length.        The hip was then thoroughly irrigated and then reduced.  We closed it in layers.  We closed the joint capsule with interrupted 0 Vicryl sutures, closed the abductor with #5 Ethibond sutures through bone tunnels.  We closed the tensor fascia with interrupted and running 0 Vicryl sutures, and closed skin and soft tissue with absorbable sutures and staples in the skin.  The patient was placed in a  well-padded postoperative dressing, taken to recovery room in stable condition.  He tolerated the procedure without difficulty.         ERUM JI MD             D: 2018   T: 2018   MT: GAVIOTA      Name:     POPEYE VALENCIA   MRN:      [id number]        Account:        [id number]   :      1965           Procedure Date: 2018      Document: [id number]       normal...

## 2024-06-17 PROBLEM — Z76.89 HEALTH CARE HOME: Status: RESOLVED | Noted: 2018-06-28 | Resolved: 2024-06-17

## (undated) DEVICE — SUTURE VICRYL+ 0 CT-1 18" DYED VIO VCP740D

## (undated) DEVICE — SPONGE RAY-TEC 4X8" 7318

## (undated) DEVICE — SOL NACL 0.9% IRRIG 3000ML BAG 2B7477

## (undated) DEVICE — BAG CLEAR TRASH 1.3M 39X33" P4040C

## (undated) DEVICE — CLEANSER WOUND IRRISEPT 0.05% CHG IRRISEPT-403

## (undated) DEVICE — LINEN DRAPE 54X72" 5467

## (undated) DEVICE — CATH TRAY FOLEY COUDE SURESTEP 16FR W/DRN BAG LATEX A304416A

## (undated) DEVICE — PACK TOTAL HIP RIDGES LATEX PO15HIFSG

## (undated) DEVICE — IMM PILLOW ABDUCT HIP MED M60-025-M

## (undated) DEVICE — SOL WATER IRRIG 1000ML BOTTLE 2F7114

## (undated) DEVICE — GLOVE PROTEXIS BLUE W/NEU-THERA 7.0  2D73EB70

## (undated) DEVICE — SUCTION MANIFOLD NEPTUNE 2 SYS 4 PORT 0702-020-000

## (undated) DEVICE — SU VICRYL+ 0 CT 36" DYED VCP358H

## (undated) DEVICE — LINEN FULL SHEET 5511

## (undated) DEVICE — DRSG GAUZE 4X4" 8044

## (undated) DEVICE — BLADE SAW SAGITTAL STRK 25X90X1.27MM HD SYS 6 6125-127-090

## (undated) DEVICE — GLOVE PROTEXIS POWDER FREE 7.5 ORTHOPEDIC 2D73ET75

## (undated) DEVICE — SET HANDPIECE INTERPULSE W/COAXIAL FAN SPRAY TIP 0210118000

## (undated) DEVICE — GLOVE PROTEXIS POWDER FREE SMT 8.0  2D72PT80X

## (undated) DEVICE — SUTURE MONOCRYL+ 2-0 CT-1 36" UNDYED MCP945H

## (undated) DEVICE — DRSG AQUACEL AG 3.5X12" HYDROFIBER 420670

## (undated) DEVICE — LINEN HALF SHEET 5512

## (undated) DEVICE — GLOVE PROTEXIS POWDER FREE 7.0 ORTHOPEDIC 2D73ET70

## (undated) DEVICE — HOOD FLYTE W/PEELAWAY 408-800-100

## (undated) DEVICE — SU ETHIBOND 5 V-37 4X30" MB66G

## (undated) DEVICE — DRAPE STERI TOWEL LG 1010

## (undated) DEVICE — GLOVE PROTEXIS BLUE W/NEU-THERA 8.0  2D73EB80

## (undated) DEVICE — LINEN ORTHO ACL PACK 5447

## (undated) DEVICE — PREP CHLORAPREP 26ML TINTED ORANGE  260815

## (undated) RX ORDER — FENTANYL CITRATE 50 UG/ML
INJECTION, SOLUTION INTRAMUSCULAR; INTRAVENOUS
Status: DISPENSED
Start: 2018-07-13

## (undated) RX ORDER — LIDOCAINE HYDROCHLORIDE 10 MG/ML
INJECTION, SOLUTION EPIDURAL; INFILTRATION; INTRACAUDAL; PERINEURAL
Status: DISPENSED
Start: 2018-07-13

## (undated) RX ORDER — CEFAZOLIN SODIUM 2 G/100ML
INJECTION, SOLUTION INTRAVENOUS
Status: DISPENSED
Start: 2018-07-13

## (undated) RX ORDER — GLYCOPYRROLATE 0.2 MG/ML
INJECTION INTRAMUSCULAR; INTRAVENOUS
Status: DISPENSED
Start: 2018-07-13

## (undated) RX ORDER — DEXAMETHASONE SODIUM PHOSPHATE 4 MG/ML
INJECTION, SOLUTION INTRA-ARTICULAR; INTRALESIONAL; INTRAMUSCULAR; INTRAVENOUS; SOFT TISSUE
Status: DISPENSED
Start: 2018-07-13

## (undated) RX ORDER — ONDANSETRON 2 MG/ML
INJECTION INTRAMUSCULAR; INTRAVENOUS
Status: DISPENSED
Start: 2018-07-13

## (undated) RX ORDER — PANTOPRAZOLE SODIUM 40 MG/1
TABLET, DELAYED RELEASE ORAL
Status: DISPENSED
Start: 2018-07-13

## (undated) RX ORDER — PROPOFOL 10 MG/ML
INJECTION, EMULSION INTRAVENOUS
Status: DISPENSED
Start: 2018-07-13

## (undated) RX ORDER — HYDROMORPHONE HYDROCHLORIDE 1 MG/ML
INJECTION, SOLUTION INTRAMUSCULAR; INTRAVENOUS; SUBCUTANEOUS
Status: DISPENSED
Start: 2018-07-13

## (undated) RX ORDER — CELECOXIB 200 MG/1
CAPSULE ORAL
Status: DISPENSED
Start: 2018-07-13